# Patient Record
Sex: FEMALE | Race: WHITE | NOT HISPANIC OR LATINO | Employment: UNEMPLOYED | URBAN - NONMETROPOLITAN AREA
[De-identification: names, ages, dates, MRNs, and addresses within clinical notes are randomized per-mention and may not be internally consistent; named-entity substitution may affect disease eponyms.]

---

## 2022-11-14 ENCOUNTER — HOSPITAL ENCOUNTER (EMERGENCY)
Facility: HOSPITAL | Age: 47
Discharge: HOME | End: 2022-11-14
Attending: FAMILY MEDICINE

## 2022-11-14 VITALS
RESPIRATION RATE: 18 BRPM | HEIGHT: 69 IN | TEMPERATURE: 99 F | WEIGHT: 160 LBS | DIASTOLIC BLOOD PRESSURE: 84 MMHG | BODY MASS INDEX: 23.7 KG/M2 | OXYGEN SATURATION: 99 % | HEART RATE: 102 BPM | SYSTOLIC BLOOD PRESSURE: 138 MMHG

## 2022-11-14 DIAGNOSIS — N39.0 URINARY TRACT INFECTION WITHOUT HEMATURIA, SITE UNSPECIFIED: Primary | ICD-10-CM

## 2022-11-14 DIAGNOSIS — A59.9 TRICHOMONIASIS, UNSPECIFIED: ICD-10-CM

## 2022-11-14 LAB
ALBUMIN SERPL BCP-MCNC: 4.1 G/DL (ref 3.5–5)
ALBUMIN/GLOB SERPL: 1 {RATIO}
ALP SERPL-CCNC: 88 U/L (ref 39–100)
ALT SERPL W P-5'-P-CCNC: 20 U/L (ref 13–56)
ANION GAP SERPL CALCULATED.3IONS-SCNC: 10 MMOL/L (ref 7–16)
AST SERPL W P-5'-P-CCNC: 17 U/L (ref 15–37)
BACTERIA #/AREA URNS HPF: ABNORMAL /HPF
BASOPHILS # BLD AUTO: 0.04 K/UL (ref 0–0.2)
BASOPHILS NFR BLD AUTO: 0.5 % (ref 0–1)
BILIRUB SERPL-MCNC: 0.8 MG/DL (ref ?–1.2)
BILIRUB UR QL STRIP: NEGATIVE
BUN SERPL-MCNC: 9 MG/DL (ref 7–18)
BUN/CREAT SERPL: 11 (ref 6–20)
CALCIUM SERPL-MCNC: 9.7 MG/DL (ref 8.5–10.1)
CHLORIDE SERPL-SCNC: 99 MMOL/L (ref 98–107)
CLARITY UR: ABNORMAL
CO2 SERPL-SCNC: 33 MMOL/L (ref 21–32)
COLOR UR: YELLOW
CREAT SERPL-MCNC: 0.84 MG/DL (ref 0.55–1.02)
DIFFERENTIAL METHOD BLD: ABNORMAL
EGFR (NO RACE VARIABLE) (RUSH/TITUS): 87 ML/MIN/1.73M²
EOSINOPHIL # BLD AUTO: 0.24 K/UL (ref 0–0.5)
EOSINOPHIL NFR BLD AUTO: 3 % (ref 1–4)
ERYTHROCYTE [DISTWIDTH] IN BLOOD BY AUTOMATED COUNT: 12.7 % (ref 11.5–14.5)
GLOBULIN SER-MCNC: 4.1 G/DL (ref 2–4)
GLUCOSE SERPL-MCNC: 317 MG/DL (ref 74–106)
GLUCOSE UR STRIP-MCNC: >=1000 MG/DL
HAV IGM SER QL: NORMAL
HBV CORE IGM SER QL: NORMAL
HBV SURFACE AG SERPL QL IA: NORMAL
HCG UR QL IA.RAPID: NEGATIVE
HCT VFR BLD AUTO: 45.6 % (ref 38–47)
HCV AB SER QL: NORMAL
HGB BLD-MCNC: 15.3 G/DL (ref 12–16)
KETONES UR STRIP-SCNC: ABNORMAL MG/DL
LEUKOCYTE ESTERASE UR QL STRIP: ABNORMAL
LYMPHOCYTES # BLD AUTO: 2.1 K/UL (ref 1–4.8)
LYMPHOCYTES NFR BLD AUTO: 26.7 % (ref 27–41)
MCH RBC QN AUTO: 30.1 PG (ref 27–31)
MCHC RBC AUTO-ENTMCNC: 33.6 G/DL (ref 32–36)
MCV RBC AUTO: 89.6 FL (ref 80–96)
MONOCYTES # BLD AUTO: 0.58 K/UL (ref 0–0.8)
MONOCYTES NFR BLD AUTO: 7.4 % (ref 2–6)
MPC BLD CALC-MCNC: 10.5 FL (ref 9.4–12.4)
MUCOUS THREADS #/AREA URNS HPF: ABNORMAL /HPF
NEUTROPHILS # BLD AUTO: 4.91 K/UL (ref 1.8–7.7)
NEUTROPHILS NFR BLD AUTO: 62.4 % (ref 53–65)
NITRITE UR QL STRIP: POSITIVE
PH UR STRIP: 6 PH UNITS
PLATELET # BLD AUTO: 277 K/UL (ref 150–400)
POTASSIUM SERPL-SCNC: 3.9 MMOL/L (ref 3.5–5.1)
PROT SERPL-MCNC: 8.2 G/DL (ref 6.4–8.2)
PROT UR QL STRIP: NEGATIVE
RBC # BLD AUTO: 5.09 M/UL (ref 4.2–5.4)
RBC # UR STRIP: NEGATIVE /UL
RBC #/AREA URNS HPF: ABNORMAL /HPF
SODIUM SERPL-SCNC: 138 MMOL/L (ref 136–145)
SP GR UR STRIP: >=1.03
SQUAMOUS #/AREA URNS LPF: ABNORMAL /LPF
SYPHILIS AB INTERPRETATION: NORMAL
TRICHOMONAS #/AREA URNS HPF: ABNORMAL /HPF
UROBILINOGEN UR STRIP-ACNC: 0.2 MG/DL
WBC # BLD AUTO: 7.87 K/UL (ref 4.5–11)
WBC #/AREA URNS HPF: ABNORMAL /HPF

## 2022-11-14 PROCEDURE — 87491 CHLMYD TRACH DNA AMP PROBE: CPT | Performed by: FAMILY MEDICINE

## 2022-11-14 PROCEDURE — 99283 EMERGENCY DEPT VISIT LOW MDM: CPT | Mod: ,,, | Performed by: FAMILY MEDICINE

## 2022-11-14 PROCEDURE — 99283 PR EMERGENCY DEPT VISIT,LEVEL III: ICD-10-PCS | Mod: ,,, | Performed by: FAMILY MEDICINE

## 2022-11-14 PROCEDURE — 81001 URINALYSIS AUTO W/SCOPE: CPT | Performed by: FAMILY MEDICINE

## 2022-11-14 PROCEDURE — 96372 THER/PROPH/DIAG INJ SC/IM: CPT

## 2022-11-14 PROCEDURE — 81025 URINE PREGNANCY TEST: CPT | Performed by: FAMILY MEDICINE

## 2022-11-14 PROCEDURE — 87591 N.GONORRHOEAE DNA AMP PROB: CPT | Performed by: FAMILY MEDICINE

## 2022-11-14 PROCEDURE — 36415 COLL VENOUS BLD VENIPUNCTURE: CPT | Performed by: FAMILY MEDICINE

## 2022-11-14 PROCEDURE — 85025 COMPLETE CBC W/AUTO DIFF WBC: CPT | Performed by: FAMILY MEDICINE

## 2022-11-14 PROCEDURE — 99284 EMERGENCY DEPT VISIT MOD MDM: CPT

## 2022-11-14 PROCEDURE — 63600175 PHARM REV CODE 636 W HCPCS: Performed by: FAMILY MEDICINE

## 2022-11-14 PROCEDURE — 86780 TREPONEMA PALLIDUM: CPT | Performed by: FAMILY MEDICINE

## 2022-11-14 PROCEDURE — 87389 HIV-1 AG W/HIV-1&-2 AB AG IA: CPT | Performed by: FAMILY MEDICINE

## 2022-11-14 PROCEDURE — 80074 ACUTE HEPATITIS PANEL: CPT | Performed by: FAMILY MEDICINE

## 2022-11-14 PROCEDURE — 80053 COMPREHEN METABOLIC PANEL: CPT | Performed by: FAMILY MEDICINE

## 2022-11-14 PROCEDURE — 87086 URINE CULTURE/COLONY COUNT: CPT | Performed by: FAMILY MEDICINE

## 2022-11-14 PROCEDURE — 25000003 PHARM REV CODE 250: Performed by: FAMILY MEDICINE

## 2022-11-14 RX ORDER — ASPIRIN 81 MG/1
81 TABLET ORAL DAILY
COMMUNITY
End: 2023-06-09

## 2022-11-14 RX ORDER — LIDOCAINE HYDROCHLORIDE 10 MG/ML
1 INJECTION INFILTRATION; PERINEURAL ONCE
Status: COMPLETED | OUTPATIENT
Start: 2022-11-14 | End: 2022-11-14

## 2022-11-14 RX ORDER — DOXYCYCLINE 100 MG/1
100 CAPSULE ORAL 2 TIMES DAILY
Qty: 20 CAPSULE | Refills: 0 | Status: SHIPPED | OUTPATIENT
Start: 2022-11-14 | End: 2022-11-24

## 2022-11-14 RX ORDER — METRONIDAZOLE 500 MG/1
500 TABLET ORAL EVERY 12 HOURS
Qty: 21 TABLET | Refills: 0 | Status: SHIPPED | OUTPATIENT
Start: 2022-11-14 | End: 2022-11-21

## 2022-11-14 RX ORDER — CEFTRIAXONE 1 G/1
1 INJECTION, POWDER, FOR SOLUTION INTRAMUSCULAR; INTRAVENOUS ONCE
Status: COMPLETED | OUTPATIENT
Start: 2022-11-14 | End: 2022-11-14

## 2022-11-14 RX ADMIN — LIDOCAINE HYDROCHLORIDE 1 ML: 10 INJECTION, SOLUTION INFILTRATION; PERINEURAL at 04:11

## 2022-11-14 RX ADMIN — CEFTRIAXONE 1 G: 1 INJECTION, POWDER, FOR SOLUTION INTRAMUSCULAR; INTRAVENOUS at 04:11
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
## 2022-11-15 ENCOUNTER — TELEPHONE (OUTPATIENT)
Dept: EMERGENCY MEDICINE | Facility: HOSPITAL | Age: 47
End: 2022-11-15

## 2022-11-15 LAB
CHLAMYDIA BY PCR: NEGATIVE
HIV 1+O+2 AB SERPL QL: NORMAL
N. GONORRHOEAE (GC) BY PCR: NEGATIVE
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
## 2022-11-16 ENCOUNTER — TELEPHONE (OUTPATIENT)
Dept: EMERGENCY MEDICINE | Facility: HOSPITAL | Age: 47
End: 2022-11-16

## 2022-11-16 LAB — UA COMPLETE W REFLEX CULTURE PNL UR: NORMAL

## 2022-11-17 ENCOUNTER — TELEPHONE (OUTPATIENT)
Dept: EMERGENCY MEDICINE | Facility: HOSPITAL | Age: 47
End: 2022-11-17

## 2023-04-08 ENCOUNTER — HOSPITAL ENCOUNTER (EMERGENCY)
Facility: HOSPITAL | Age: 48
Discharge: LEFT BEFORE DISCHARGE | End: 2023-04-08
Attending: FAMILY MEDICINE

## 2023-04-08 VITALS
WEIGHT: 173 LBS | RESPIRATION RATE: 20 BRPM | OXYGEN SATURATION: 99 % | HEIGHT: 69 IN | TEMPERATURE: 99 F | HEART RATE: 111 BPM | SYSTOLIC BLOOD PRESSURE: 158 MMHG | BODY MASS INDEX: 25.62 KG/M2 | DIASTOLIC BLOOD PRESSURE: 97 MMHG

## 2023-04-08 DIAGNOSIS — N39.0 URINARY TRACT INFECTION WITHOUT HEMATURIA, SITE UNSPECIFIED: ICD-10-CM

## 2023-04-08 DIAGNOSIS — R73.9 HYPERGLYCEMIA: Primary | ICD-10-CM

## 2023-04-08 LAB
ALBUMIN SERPL BCP-MCNC: 3.3 G/DL (ref 3.5–5)
ALBUMIN/GLOB SERPL: 0.8 {RATIO}
ALP SERPL-CCNC: 103 U/L (ref 39–100)
ALT SERPL W P-5'-P-CCNC: 24 U/L (ref 13–56)
ANION GAP SERPL CALCULATED.3IONS-SCNC: 10 MMOL/L (ref 7–16)
AST SERPL W P-5'-P-CCNC: 21 U/L (ref 15–37)
BACTERIA #/AREA URNS HPF: ABNORMAL /HPF
BASOPHILS # BLD AUTO: 0.03 K/UL (ref 0–0.2)
BASOPHILS NFR BLD AUTO: 0.3 % (ref 0–1)
BILIRUB SERPL-MCNC: 0.7 MG/DL (ref ?–1.2)
BILIRUB UR QL STRIP: NEGATIVE
BUN SERPL-MCNC: 10 MG/DL (ref 7–18)
BUN/CREAT SERPL: 9 (ref 6–20)
CALCIUM SERPL-MCNC: 8.3 MG/DL (ref 8.5–10.1)
CHLORIDE SERPL-SCNC: 94 MMOL/L (ref 98–107)
CLARITY UR: ABNORMAL
CO2 SERPL-SCNC: 30 MMOL/L (ref 21–32)
COLOR UR: YELLOW
CREAT SERPL-MCNC: 1.11 MG/DL (ref 0.55–1.02)
D DIMER PPP FEU-MCNC: 0.41 ΜG/ML (ref 0–0.47)
DIFFERENTIAL METHOD BLD: ABNORMAL
EGFR (NO RACE VARIABLE) (RUSH/TITUS): 62 ML/MIN/1.73M²
EOSINOPHIL # BLD AUTO: 0.06 K/UL (ref 0–0.5)
EOSINOPHIL NFR BLD AUTO: 0.6 % (ref 1–4)
ERYTHROCYTE [DISTWIDTH] IN BLOOD BY AUTOMATED COUNT: 12 % (ref 11.5–14.5)
FLUAV AG UPPER RESP QL IA.RAPID: NEGATIVE
FLUBV AG UPPER RESP QL IA.RAPID: NEGATIVE
GLOBULIN SER-MCNC: 4.1 G/DL (ref 2–4)
GLUCOSE SERPL-MCNC: 458 MG/DL (ref 74–106)
GLUCOSE UR STRIP-MCNC: >=1000 MG/DL
HCT VFR BLD AUTO: 40.7 % (ref 38–47)
HGB BLD-MCNC: 13.5 G/DL (ref 12–16)
KETONES UR STRIP-SCNC: NEGATIVE MG/DL
LEUKOCYTE ESTERASE UR QL STRIP: ABNORMAL
LYMPHOCYTES # BLD AUTO: 1.12 K/UL (ref 1–4.8)
LYMPHOCYTES NFR BLD AUTO: 11.5 % (ref 27–41)
MCH RBC QN AUTO: 29.4 PG (ref 27–31)
MCHC RBC AUTO-ENTMCNC: 33.2 G/DL (ref 32–36)
MCV RBC AUTO: 88.7 FL (ref 80–96)
MONOCYTES # BLD AUTO: 0.98 K/UL (ref 0–0.8)
MONOCYTES NFR BLD AUTO: 10.1 % (ref 2–6)
MPC BLD CALC-MCNC: 10.6 FL (ref 9.4–12.4)
NEUTROPHILS # BLD AUTO: 7.55 K/UL (ref 1.8–7.7)
NEUTROPHILS NFR BLD AUTO: 77.5 % (ref 53–65)
NITRITE UR QL STRIP: NEGATIVE
NT-PROBNP SERPL-MCNC: 145 PG/ML (ref 1–125)
PH UR STRIP: 6 PH UNITS
PLATELET # BLD AUTO: 231 K/UL (ref 150–400)
POTASSIUM SERPL-SCNC: 4 MMOL/L (ref 3.5–5.1)
PROT SERPL-MCNC: 7.4 G/DL (ref 6.4–8.2)
PROT UR QL STRIP: 30
RBC # BLD AUTO: 4.59 M/UL (ref 4.2–5.4)
RBC # UR STRIP: ABNORMAL /UL
RBC #/AREA URNS HPF: ABNORMAL /HPF
SARS-COV+SARS-COV-2 AG RESP QL IA.RAPID: NEGATIVE
SODIUM SERPL-SCNC: 130 MMOL/L (ref 136–145)
SP GR UR STRIP: 1.01
SQUAMOUS #/AREA URNS LPF: ABNORMAL /LPF
UROBILINOGEN UR STRIP-ACNC: 0.2 MG/DL
WBC # BLD AUTO: 9.74 K/UL (ref 4.5–11)
WBC #/AREA URNS HPF: ABNORMAL /HPF
WBC CLUMPS, UA: ABNORMAL /HPF
YEAST #/AREA URNS HPF: ABNORMAL /HPF

## 2023-04-08 PROCEDURE — 99283 EMERGENCY DEPT VISIT LOW MDM: CPT

## 2023-04-08 PROCEDURE — 80053 COMPREHEN METABOLIC PANEL: CPT | Performed by: FAMILY MEDICINE

## 2023-04-08 PROCEDURE — 83880 ASSAY OF NATRIURETIC PEPTIDE: CPT | Performed by: FAMILY MEDICINE

## 2023-04-08 PROCEDURE — 99284 PR EMERGENCY DEPT VISIT,LEVEL IV: ICD-10-PCS | Mod: ,,, | Performed by: FAMILY MEDICINE

## 2023-04-08 PROCEDURE — 87428 SARSCOV & INF VIR A&B AG IA: CPT | Performed by: FAMILY MEDICINE

## 2023-04-08 PROCEDURE — 85025 COMPLETE CBC W/AUTO DIFF WBC: CPT | Performed by: FAMILY MEDICINE

## 2023-04-08 PROCEDURE — 81001 URINALYSIS AUTO W/SCOPE: CPT | Performed by: FAMILY MEDICINE

## 2023-04-08 PROCEDURE — 87077 CULTURE AEROBIC IDENTIFY: CPT | Performed by: FAMILY MEDICINE

## 2023-04-08 PROCEDURE — 99284 EMERGENCY DEPT VISIT MOD MDM: CPT | Mod: ,,, | Performed by: FAMILY MEDICINE

## 2023-04-08 PROCEDURE — 25000003 PHARM REV CODE 250: Performed by: FAMILY MEDICINE

## 2023-04-08 PROCEDURE — 85379 FIBRIN DEGRADATION QUANT: CPT | Performed by: FAMILY MEDICINE

## 2023-04-08 PROCEDURE — 87186 SC STD MICRODIL/AGAR DIL: CPT | Performed by: FAMILY MEDICINE

## 2023-04-08 RX ORDER — SODIUM CHLORIDE 9 MG/ML
1000 INJECTION, SOLUTION INTRAVENOUS
Status: DISCONTINUED | OUTPATIENT
Start: 2023-04-08 | End: 2023-04-08 | Stop reason: HOSPADM

## 2023-04-08 NOTE — ED NOTES
Pt states she cannot stay any longer and agreed to sign out ama. Stressed the importance of follow up in clinic to treat uti.

## 2023-04-08 NOTE — ED PROVIDER NOTES
Encounter Date: 2023       History     Chief Complaint   Patient presents with    Dysuria    Back Pain    Sore Throat    Leg Pain     MAYELIN. HX DVT THAT LEAD TO PE     HPI  Review of patient's allergies indicates:   Allergen Reactions    Phenergan [promethazine] Other (See Comments)     VIOLENT     Floxin [ofloxacin]     Penicillins     Reglan [metoclopramide]      Past Medical History:   Diagnosis Date    Diabetes mellitus     DVT (deep venous thrombosis)     Other pulmonary embolism without acute cor pulmonale      Past Surgical History:   Procedure Laterality Date    BLADDER SURGERY       SECTION      HYSTERECTOMY       History reviewed. No pertinent family history.  Social History     Tobacco Use    Smoking status: Never    Smokeless tobacco: Current     Types: Snuff   Substance Use Topics    Alcohol use: Yes     Comment: occasional    Drug use: Not Currently     Types: Methamphetamines     Review of Systems    Physical Exam     Initial Vitals [23 1425]   BP Pulse Resp Temp SpO2   (!) 158/97 (!) 111 20 98.9 °F (37.2 °C) 99 %      MAP       --         Physical Exam    Medical Screening Exam   See Full Note    ED Course   Procedures  Labs Reviewed   URINALYSIS, REFLEX TO URINE CULTURE - Abnormal; Notable for the following components:       Result Value    Leukocytes, UA Trace (*)     Protein, UA 30 (*)     Glucose, UA >=1000 (*)     Blood, UA Trace-Intact (*)     All other components within normal limits   URINALYSIS, MICROSCOPIC - Abnormal; Notable for the following components:    WBC, UA Too Numerous To Count (*)     Bacteria, UA Loaded (*)     Yeast, UA Occasional (*)     Squamous Epithelial Cells, UA Too Numerous To Count (*)     WBC Clumps Rare (*)     All other components within normal limits   COMPREHENSIVE METABOLIC PANEL - Abnormal; Notable for the following components:    Sodium 130 (*)     Chloride 94 (*)     Glucose 458 (*)     Creatinine 1.11 (*)     Calcium 8.3 (*)     Albumin 3.3 (*)      Globulin 4.1 (*)     Alk Phos 103 (*)     All other components within normal limits   CBC WITH DIFFERENTIAL - Abnormal; Notable for the following components:    Neutrophils % 77.5 (*)     Lymphocytes % 11.5 (*)     Monocytes % 10.1 (*)     Eosinophils % 0.6 (*)     Monocytes, Absolute 0.98 (*)     All other components within normal limits   NT-PRO NATRIURETIC PEPTIDE - Abnormal; Notable for the following components:    ProBNP 145 (*)     All other components within normal limits   D DIMER, QUANTITATIVE - Normal   SARS-COV2 (COVID) W/ FLU ANTIGEN - Normal    Narrative:     Negative SARS-CoV results should not be used as the sole basis for treatment or patient management decisions; negative results should be considered in the context of a patient's recent exposures, history and the presene of clinical signs and symptoms consistent with COVID-19.  Negative results should be treated as presumptive and confirmed by molecular assay, if necessary for patient management.   CULTURE, URINE   CBC W/ AUTO DIFFERENTIAL    Narrative:     The following orders were created for panel order CBC auto differential.  Procedure                               Abnormality         Status                     ---------                               -----------         ------                     CBC with Differential[[id number]]        Abnormal            Final result                 Please view results for these tests on the individual orders.   POCT GLUCOSE MONITORING CONTINUOUS          Imaging Results    None          Medications   0.9%  NaCl infusion (1,000 mLs Intravenous Not Given 4/8/23 1701)   insulin regular injection 10 Units 0.1 mL (10 Units Intravenous Not Given 4/8/23 1700)     Medical Decision Making:   ED Management:  CBC OK,   CMP: GLUCOSE >400    PLANNED ON IV INSULIN AND FLUIDS AND ABX FOR UTI  PT CHOOSE TO LEAVE AMA                 Clinical Impression:   Final diagnoses:  [R73.9] Hyperglycemia (Primary)  [N39.0]  Urinary tract infection without hematuria, site unspecified        ED Disposition Condition    AMA Stable                Marialuisa Kenney MD  04/08/23 [id number]

## 2023-04-08 NOTE — ED TRIAGE NOTES
PT ARRIVED TO ER WITH C/O LOWER BACK PAIN AND FOUL SMELLING URINE X 2 DAYS. ALSO HAVING MAYELIN LOWER LEG PAIN. PT STATES HAS HX OF DVT THAT CAUSED PE ABOUT 2 YEARS AGO. HAS BEEN OFF OF XARELTO FOR ABOUT A YEAR DUE TO COST. ALSO HAS SORE THROAT.

## 2023-04-12 LAB
UA COMPLETE W REFLEX CULTURE PNL UR: ABNORMAL

## 2023-06-09 ENCOUNTER — HOSPITAL ENCOUNTER (OUTPATIENT)
Facility: HOSPITAL | Age: 48
Discharge: HOME | End: 2023-06-11
Attending: FAMILY MEDICINE | Admitting: FAMILY MEDICINE

## 2023-06-09 DIAGNOSIS — R11.2 NAUSEA AND VOMITING, UNSPECIFIED VOMITING TYPE: ICD-10-CM

## 2023-06-09 DIAGNOSIS — K85.90 ACUTE PANCREATITIS WITHOUT INFECTION OR NECROSIS, UNSPECIFIED PANCREATITIS TYPE: Primary | ICD-10-CM

## 2023-06-09 PROBLEM — Z79.4 TYPE 2 DIABETES MELLITUS, WITH LONG-TERM CURRENT USE OF INSULIN: Status: ACTIVE | Noted: 2023-06-09

## 2023-06-09 PROBLEM — E11.9 TYPE 2 DIABETES MELLITUS, WITH LONG-TERM CURRENT USE OF INSULIN: Status: ACTIVE | Noted: 2023-06-09

## 2023-06-09 LAB
ALBUMIN SERPL BCP-MCNC: 3.1 G/DL (ref 3.5–5)
ALBUMIN/GLOB SERPL: 0.7 {RATIO}
ALP SERPL-CCNC: 419 U/L (ref 39–100)
ALT SERPL W P-5'-P-CCNC: 953 U/L (ref 13–56)
AMPHET UR QL SCN: POSITIVE
ANION GAP SERPL CALCULATED.3IONS-SCNC: 13 MMOL/L (ref 7–16)
AST SERPL W P-5'-P-CCNC: 545 U/L (ref 15–37)
BARBITURATES UR QL SCN: NEGATIVE
BASOPHILS # BLD AUTO: 0.03 K/UL (ref 0–0.2)
BASOPHILS NFR BLD AUTO: 0.5 % (ref 0–1)
BENZODIAZ METAB UR QL SCN: NEGATIVE
BILIRUB SERPL-MCNC: 2.2 MG/DL (ref ?–1.2)
BILIRUB UR QL STRIP: NEGATIVE
BUN SERPL-MCNC: 9 MG/DL (ref 7–18)
BUN/CREAT SERPL: 11 (ref 6–20)
CALCIUM SERPL-MCNC: 8 MG/DL (ref 8.5–10.1)
CANNABINOIDS UR QL SCN: NEGATIVE
CHLORIDE SERPL-SCNC: 97 MMOL/L (ref 98–107)
CHOLEST SERPL-MCNC: 154 MG/DL (ref 0–200)
CHOLEST/HDLC SERPL: 8.6 {RATIO}
CLARITY UR: CLEAR
CO2 SERPL-SCNC: 26 MMOL/L (ref 21–32)
COCAINE UR QL SCN: NEGATIVE
COLOR UR: YELLOW
CREAT SERPL-MCNC: 0.81 MG/DL (ref 0.55–1.02)
DIFFERENTIAL METHOD BLD: ABNORMAL
EGFR (NO RACE VARIABLE) (RUSH/TITUS): 90 ML/MIN/1.73M2
EOSINOPHIL # BLD AUTO: 0.1 K/UL (ref 0–0.5)
EOSINOPHIL NFR BLD AUTO: 1.8 % (ref 1–4)
ERYTHROCYTE [DISTWIDTH] IN BLOOD BY AUTOMATED COUNT: 14.1 % (ref 11.5–14.5)
FLUAV AG UPPER RESP QL IA.RAPID: NEGATIVE
FLUBV AG UPPER RESP QL IA.RAPID: NEGATIVE
GLOBULIN SER-MCNC: 4.5 G/DL (ref 2–4)
GLUCOSE SERPL-MCNC: 369 MG/DL (ref 70–105)
GLUCOSE SERPL-MCNC: 404 MG/DL (ref 70–105)
GLUCOSE SERPL-MCNC: 454 MG/DL (ref 74–106)
GLUCOSE UR STRIP-MCNC: >=1000 MG/DL
HCT VFR BLD AUTO: 39.2 % (ref 38–47)
HDLC SERPL-MCNC: 18 MG/DL (ref 40–60)
HGB BLD-MCNC: 13.4 G/DL (ref 12–16)
KETONES UR STRIP-SCNC: NEGATIVE MG/DL
LDLC SERPL CALC-MCNC: 78 MG/DL
LDLC/HDLC SERPL: 4.3 {RATIO}
LEUKOCYTE ESTERASE UR QL STRIP: NEGATIVE
LIPASE SERPL-CCNC: 512 U/L (ref 73–393)
LYMPHOCYTES # BLD AUTO: 1.35 K/UL (ref 1–4.8)
LYMPHOCYTES NFR BLD AUTO: 24.1 % (ref 27–41)
MCH RBC QN AUTO: 29.1 PG (ref 27–31)
MCHC RBC AUTO-ENTMCNC: 34.2 G/DL (ref 32–36)
MCV RBC AUTO: 85 FL (ref 80–96)
MONOCYTES # BLD AUTO: 0.61 K/UL (ref 0–0.8)
MONOCYTES NFR BLD AUTO: 10.9 % (ref 2–6)
MPC BLD CALC-MCNC: 11.2 FL (ref 9.4–12.4)
NEUTROPHILS # BLD AUTO: 3.52 K/UL (ref 1.8–7.7)
NEUTROPHILS NFR BLD AUTO: 62.7 % (ref 53–65)
NITRITE UR QL STRIP: NEGATIVE
NONHDLC SERPL-MCNC: 136 MG/DL
OPIATES UR QL SCN: NEGATIVE
PCP UR QL SCN: NEGATIVE
PH UR STRIP: 7 PH UNITS
PLATELET # BLD AUTO: 208 K/UL (ref 150–400)
POTASSIUM SERPL-SCNC: 3.9 MMOL/L (ref 3.5–5.1)
PROT SERPL-MCNC: 7.6 G/DL (ref 6.4–8.2)
PROT UR QL STRIP: NEGATIVE
RAPID GROUP A STREP: NEGATIVE
RBC # BLD AUTO: 4.61 M/UL (ref 4.2–5.4)
RBC # UR STRIP: NEGATIVE /UL
SARS-COV+SARS-COV-2 AG RESP QL IA.RAPID: NEGATIVE
SODIUM SERPL-SCNC: 132 MMOL/L (ref 136–145)
SP GR UR STRIP: 1.01
TRIGL SERPL-MCNC: 288 MG/DL (ref 35–150)
UROBILINOGEN UR STRIP-ACNC: 4 MG/DL
VLDLC SERPL-MCNC: 58 MG/DL
WBC # BLD AUTO: 5.61 K/UL (ref 4.5–11)

## 2023-06-09 PROCEDURE — 99222 PR INITIAL HOSPITAL CARE,LEVL II: ICD-10-PCS | Mod: ,,, | Performed by: NURSE PRACTITIONER

## 2023-06-09 PROCEDURE — G0378 HOSPITAL OBSERVATION PER HR: HCPCS

## 2023-06-09 PROCEDURE — 83036 HEMOGLOBIN GLYCOSYLATED A1C: CPT | Performed by: NURSE PRACTITIONER

## 2023-06-09 PROCEDURE — 94761 N-INVAS EAR/PLS OXIMETRY MLT: CPT

## 2023-06-09 PROCEDURE — 96361 HYDRATE IV INFUSION ADD-ON: CPT

## 2023-06-09 PROCEDURE — 80053 COMPREHEN METABOLIC PANEL: CPT | Performed by: NURSE PRACTITIONER

## 2023-06-09 PROCEDURE — 85025 COMPLETE CBC W/AUTO DIFF WBC: CPT | Performed by: NURSE PRACTITIONER

## 2023-06-09 PROCEDURE — 25000003 PHARM REV CODE 250: Performed by: NURSE PRACTITIONER

## 2023-06-09 PROCEDURE — 96374 THER/PROPH/DIAG INJ IV PUSH: CPT

## 2023-06-09 PROCEDURE — 83690 ASSAY OF LIPASE: CPT | Performed by: NURSE PRACTITIONER

## 2023-06-09 PROCEDURE — 87880 STREP A ASSAY W/OPTIC: CPT | Performed by: NURSE PRACTITIONER

## 2023-06-09 PROCEDURE — 96375 TX/PRO/DX INJ NEW DRUG ADDON: CPT

## 2023-06-09 PROCEDURE — 63600175 PHARM REV CODE 636 W HCPCS: Performed by: NURSE PRACTITIONER

## 2023-06-09 PROCEDURE — 99285 EMERGENCY DEPT VISIT HI MDM: CPT | Mod: 25

## 2023-06-09 PROCEDURE — 82962 GLUCOSE BLOOD TEST: CPT

## 2023-06-09 PROCEDURE — C9113 INJ PANTOPRAZOLE SODIUM, VIA: HCPCS | Performed by: NURSE PRACTITIONER

## 2023-06-09 PROCEDURE — 25500020 PHARM REV CODE 255: Performed by: NURSE PRACTITIONER

## 2023-06-09 PROCEDURE — 80061 LIPID PANEL: CPT | Performed by: NURSE PRACTITIONER

## 2023-06-09 PROCEDURE — 99222 1ST HOSP IP/OBS MODERATE 55: CPT | Mod: ,,, | Performed by: NURSE PRACTITIONER

## 2023-06-09 PROCEDURE — 87428 SARSCOV & INF VIR A&B AG IA: CPT | Performed by: NURSE PRACTITIONER

## 2023-06-09 PROCEDURE — 80307 DRUG TEST PRSMV CHEM ANLYZR: CPT | Performed by: NURSE PRACTITIONER

## 2023-06-09 PROCEDURE — 99285 EMERGENCY DEPT VISIT HI MDM: CPT | Mod: ,,, | Performed by: NURSE PRACTITIONER

## 2023-06-09 PROCEDURE — 99285 PR EMERGENCY DEPT VISIT,LEVEL V: ICD-10-PCS | Mod: ,,, | Performed by: NURSE PRACTITIONER

## 2023-06-09 PROCEDURE — 81003 URINALYSIS AUTO W/O SCOPE: CPT | Performed by: NURSE PRACTITIONER

## 2023-06-09 RX ORDER — SODIUM CHLORIDE 9 MG/ML
1000 INJECTION, SOLUTION INTRAVENOUS
Status: COMPLETED | OUTPATIENT
Start: 2023-06-09 | End: 2023-06-09

## 2023-06-09 RX ORDER — PANTOPRAZOLE SODIUM 40 MG/10ML
40 INJECTION, POWDER, LYOPHILIZED, FOR SOLUTION INTRAVENOUS DAILY
Status: DISCONTINUED | OUTPATIENT
Start: 2023-06-09 | End: 2023-06-11 | Stop reason: HOSPADM

## 2023-06-09 RX ORDER — HYDROMORPHONE HYDROCHLORIDE 2 MG/ML
1 INJECTION, SOLUTION INTRAMUSCULAR; INTRAVENOUS; SUBCUTANEOUS
Status: COMPLETED | OUTPATIENT
Start: 2023-06-09 | End: 2023-06-09

## 2023-06-09 RX ORDER — INSULIN ASPART 100 [IU]/ML
1-10 INJECTION, SOLUTION INTRAVENOUS; SUBCUTANEOUS EVERY 6 HOURS PRN
Status: DISCONTINUED | OUTPATIENT
Start: 2023-06-09 | End: 2023-06-10

## 2023-06-09 RX ORDER — ONDANSETRON 2 MG/ML
4 INJECTION INTRAMUSCULAR; INTRAVENOUS EVERY 6 HOURS PRN
Status: DISCONTINUED | OUTPATIENT
Start: 2023-06-09 | End: 2023-06-11 | Stop reason: HOSPADM

## 2023-06-09 RX ORDER — ONDANSETRON 2 MG/ML
4 INJECTION INTRAMUSCULAR; INTRAVENOUS
Status: COMPLETED | OUTPATIENT
Start: 2023-06-09 | End: 2023-06-09

## 2023-06-09 RX ORDER — GLUCAGON 1 MG
1 KIT INJECTION
Status: DISCONTINUED | OUTPATIENT
Start: 2023-06-09 | End: 2023-06-11 | Stop reason: HOSPADM

## 2023-06-09 RX ORDER — HYDROMORPHONE HYDROCHLORIDE 2 MG/ML
1 INJECTION, SOLUTION INTRAMUSCULAR; INTRAVENOUS; SUBCUTANEOUS EVERY 6 HOURS PRN
Status: DISCONTINUED | OUTPATIENT
Start: 2023-06-09 | End: 2023-06-11 | Stop reason: HOSPADM

## 2023-06-09 RX ORDER — SODIUM CHLORIDE 9 MG/ML
INJECTION, SOLUTION INTRAVENOUS CONTINUOUS
Status: DISCONTINUED | OUTPATIENT
Start: 2023-06-09 | End: 2023-06-11 | Stop reason: HOSPADM

## 2023-06-09 RX ADMIN — ONDANSETRON 4 MG: 2 INJECTION INTRAMUSCULAR; INTRAVENOUS at 05:06

## 2023-06-09 RX ADMIN — PANTOPRAZOLE SODIUM 40 MG: 40 INJECTION, POWDER, FOR SOLUTION INTRAVENOUS at 09:06

## 2023-06-09 RX ADMIN — SODIUM CHLORIDE 1000 ML: 9 INJECTION, SOLUTION INTRAVENOUS at 06:06

## 2023-06-09 RX ADMIN — IOPAMIDOL 100 ML: 755 INJECTION, SOLUTION INTRAVENOUS at 06:06

## 2023-06-09 RX ADMIN — HYDROMORPHONE HYDROCHLORIDE 1 MG: 2 INJECTION, SOLUTION INTRAMUSCULAR; INTRAVENOUS; SUBCUTANEOUS at 07:06

## 2023-06-09 RX ADMIN — SODIUM CHLORIDE 1000 ML: 9 INJECTION, SOLUTION INTRAVENOUS at 07:06

## 2023-06-09 RX ADMIN — SODIUM CHLORIDE 1000 ML: 9 INJECTION, SOLUTION INTRAVENOUS at 05:06

## 2023-06-09 NOTE — ED PROVIDER NOTES
"Encounter Date: 2023       History     Chief Complaint   Patient presents with    Dysuria    Fever     47 y/o WF presents to the ED with complaint of dysuria and malodorous urine for 2 months. Over the past 2 weeks has been running fever at night only. Has not checked temperature, but having chills and sweats. Took one Tylenol this morning. Also has headache, nausea and vomiting off and on for the past 2 weeks. Last vomited just PTA.   Last meal and drink:  Denies sick contacts.  Denies tobacco use or illegal drug use. States has never used IV drugs in the past. Reports had one [employer] beer today to " help with kidneys".    The history is provided by the patient.   Review of patient's allergies indicates:   Allergen Reactions    Phenergan [promethazine] Other (See Comments)     VIOLENT     Floxin [ofloxacin]     Penicillins     Reglan [metoclopramide]      Past Medical History:   Diagnosis Date    Diabetes mellitus     DVT (deep venous thrombosis)     Other pulmonary embolism without acute cor pulmonale      Past Surgical History:   Procedure Laterality Date    BLADDER SURGERY       SECTION      HYSTERECTOMY       History reviewed. No pertinent family history.  Social History     Tobacco Use    Smoking status: Never    Smokeless tobacco: Current     Types: Snuff   Substance Use Topics    Alcohol use: Yes     Comment: occasional    Drug use: Not Currently     Types: Methamphetamines     Review of Systems   Constitutional:  Positive for activity change, appetite change, chills, diaphoresis, fatigue and fever.   HENT: Negative.  Negative for congestion, ear pain, sinus pressure, sinus pain, sore throat and trouble swallowing.    Eyes: Negative.  Negative for pain and visual disturbance.   Respiratory: Negative.  Negative for cough and shortness of breath.    Cardiovascular: Negative.  Negative for chest pain, palpitations and leg swelling.   Gastrointestinal:  Positive for nausea and vomiting. " Negative for abdominal pain, constipation and diarrhea.   Genitourinary:  Positive for dysuria. Negative for hematuria.        Odor to urine   Musculoskeletal:  Positive for myalgias (generalized body aches). Negative for back pain.   Skin: Negative.  Negative for rash.   Neurological:  Positive for headaches. Negative for dizziness and weakness.   Hematological: Negative.    Psychiatric/Behavioral: Negative.       Physical Exam     Initial Vitals [06/09/23 1640]   BP Pulse Resp Temp SpO2   122/88 105 18 98 °F (36.7 °C) 97 %      MAP       --         Physical Exam    Nursing note and vitals reviewed.  Constitutional: Vital signs are normal. She appears well-developed and well-nourished. She is cooperative. She appears ill. No distress.   HENT:   Head: Normocephalic and atraumatic.   Right Ear: Tympanic membrane, external ear and ear canal normal.   Left Ear: Tympanic membrane, external ear and ear canal normal.   Nose: Nose normal. Right sinus exhibits no maxillary sinus tenderness and no frontal sinus tenderness. Left sinus exhibits no maxillary sinus tenderness and no frontal sinus tenderness.   Mouth/Throat: Uvula is midline and oropharynx is clear and moist. Mucous membranes are dry.   Eyes: Conjunctivae and lids are normal. Pupils are equal, round, and reactive to light.   Neck: Neck supple.   Normal range of motion.   Full passive range of motion without pain.     Cardiovascular:  Regular rhythm, normal heart sounds, intact distal pulses and normal pulses.   Tachycardia present.         Pulmonary/Chest: Effort normal and breath sounds normal. She has no decreased breath sounds. She has no wheezes. She has no rhonchi. She has no rales.   Abdominal: Abdomen is soft. Bowel sounds are normal. There is abdominal tenderness in the left upper quadrant.   No right CVA tenderness.  No left CVA tenderness. There is no rebound and no guarding.   Musculoskeletal:         General: Normal range of motion.      Cervical  back: Full passive range of motion without pain, normal range of motion and neck supple.     Lymphadenopathy:     She has no cervical adenopathy.   Neurological: She is alert and oriented to person, place, and time. She has normal strength.   Skin: Skin is warm and dry. Capillary refill takes less than 2 seconds. No rash noted.   Psychiatric: She has a normal mood and affect. Her speech is normal and behavior is normal.       Medical Screening Exam   See Full Note    ED Course   Procedures  Labs Reviewed   URINALYSIS, REFLEX TO URINE CULTURE - Abnormal; Notable for the following components:       Result Value    Glucose, UA >=1000 (*)     Urobilinogen, UA 4.0 (*)     All other components within normal limits   COMPREHENSIVE METABOLIC PANEL - Abnormal; Notable for the following components:    Sodium 132 (*)     Chloride 97 (*)     Glucose 454 (*)     Calcium 8.0 (*)     Albumin 3.1 (*)     Globulin 4.5 (*)     Bilirubin, Total 2.2 (*)     Alk Phos 419 (*)      (*)      (*)     All other components within normal limits   DRUG SCREEN, URINE (BEAKER) - Abnormal; Notable for the following components:    Amphetamine, Urine Positive (*)     All other components within normal limits   LIPASE - Abnormal; Notable for the following components:    Lipase 512 (*)     All other components within normal limits   CBC WITH DIFFERENTIAL - Abnormal; Notable for the following components:    Lymphocytes % 24.1 (*)     Monocytes % 10.9 (*)     All other components within normal limits   LIPID PANEL - Abnormal; Notable for the following components:    Triglycerides 288 (*)     HDL Cholesterol 18 (*)     All other components within normal limits   POCT GLUCOSE MONITORING CONTINUOUS - Abnormal; Notable for the following components:    POC Glucose 404 (*)     All other components within normal limits   POCT GLUCOSE MONITORING CONTINUOUS - Abnormal; Notable for the following components:    POC Glucose 369 (*)     All other  components within normal limits   THROAT SCREEN, RAPID STREP - Normal   SARS-COV2 (COVID) W/ FLU ANTIGEN - Normal    Narrative:     Negative SARS-CoV results should not be used as the sole basis for treatment or patient management decisions; negative results should be considered in the context of a patient's recent exposures, history and the presene of clinical signs and symptoms consistent with COVID-19.  Negative results should be treated as presumptive and confirmed by molecular assay, if necessary for patient management.   CBC W/ AUTO DIFFERENTIAL    Narrative:     The following orders were created for panel order CBC auto differential.  Procedure                               Abnormality         Status                     ---------                               -----------         ------                     CBC with Differential[[id number]]        Abnormal            Final result                 Please view results for these tests on the individual orders.   HEMOGLOBIN A1C   POCT GLUCOSE MONITORING CONTINUOUS          Imaging Results              CT Abdomen Pelvis With Contrast (Final result)  Result time 06/09/23 18:23:25      Final result by Sridhar Varela DO (06/09/23 18:23:25)                   Impression:      Interstitial pancreatitis.  No pseudocyst.      Electronically signed by: Sridhar Varela  Date:    06/09/2023  Time:    18:23               Narrative:    EXAMINATION:  CT ABDOMEN PELVIS WITH CONTRAST    CLINICAL HISTORY:  Nausea/vomiting;Elevarted lipase, elevated liver enzymes;    COMPARISON:  None.    TECHNIQUE:  CT ABDOMEN PELVIS WITH [id number] cc of Isovue 370    FINDINGS:  Lower lobes: Clear.    Cardiac: No effusion.    Abdomen:    Hepatobiliary/gallbladder: Normal    Spleen: Normal    Pancreas: Mild fat stranding surrounding the pancreas.    Adrenal/Genitourinary system: Normal    Bowel and Mesentery: There is no evidence for bowel obstruction.    Peritoneum: Normal.    Retroperitoneum:  No enlarged lymph nodes.    Vasculature: Normal.    Reproductive: Uterus removed.  Right ovary is normal.    Lymph nodes: No enlarged lymph nodes.    Abdominal wall: Normal.    Osseous structures: Normal.                                       Medications   sodium chloride 0.9% bolus 1,000 mL 1,000 mL (0 mLs Intravenous Stopped 6/9/23 1837)   ondansetron injection 4 mg (4 mg Intravenous Given 6/9/23 1716)   iopamidoL (ISOVUE-370) injection 100 mL (100 mLs Intravenous Given 6/9/23 1806)   0.9%  NaCl infusion (0 mLs Intravenous Stopped 6/9/23 1956)   HYDROmorphone (PF) injection 1 mg (1 mg Intravenous Given 6/9/23 1947)   0.9%  NaCl infusion (1,000 mLs Intravenous New Bag 6/9/23 1947)     Medical Decision Making:   Clinical Tests:   Lab Tests: Reviewed and Ordered  The following lab test(s) were unremarkable: CMP, Urinalysis, Lipase and CBC  Radiological Study: Ordered and Reviewed  ED Management:  [facility]  Patient presents for emergent evaluation of dysuria, fever, nausea and vomiting that poses a threat to life and/or bodily function.    In the ED patient found to have VS wnl. Lungs clear. HRRR,. BS+ Left upper quad tenderness. No rebound or guarding.  I ordered labs and personally reviewed them.  Labs: CBC wnl. Na 132, K+ 3.9, BUN/CR 9/0.81, glucose 454 mg/dL, Corrected Ca level of 8.7,  Total Bili 2.2. UDS:+ amphetamine. UA: >1000 glucose, no infection noted. Lipid and A1C pending.  I ordered CT scan and personally reviewed it and reviewed the radiologist interpretation.  CT abdomen/pelvis with contrast revealed pancreatitis.    Admission [facility]  I discussed the patient presentation labs and CT findings with the consultant of Dr. Santos for admission to [facility] for IV fluids, pain and nausea management to treat pancreatitis.  Patient was managed in the ED with IV NS 1 liter, Zofran 4 mg IV x 1  The response to treatment was nausea improved.  No vomiting while in the ED.  Dr. Santos agreed to admission and  treatment plan at [facility].     19:45 Pain up to 7/10 now. Dilaudid 1 mg IV x 1.  Other:   I have discussed this case with another health care provider.       <> Summary of the Discussion: Dr. Marin agreed to CT abdomen/pelvis with contrast.                       Clinical Impression:   Final diagnoses:  [K85.90] Acute pancreatitis without infection or necrosis, unspecified pancreatitis type (Primary)  [R11.2] Nausea and vomiting, unspecified vomiting type        ED Disposition Condition    Observation Stable                         Leyla Whipple, MICHEL  06/09/23 1957

## 2023-06-09 NOTE — Clinical Note
Diagnosis: Acute pancreatitis without infection or necrosis, unspecified pancreatitis type [[id number]]   Future Attending Provider: ARNOLDO MARQUEZ [[id number]]   Requested Bed Type: Standard [1]   Admitting Provider:: SHERRY SAWYER [[id number]]   Special Needs:: No Special Needs [1]

## 2023-06-09 NOTE — ED TRIAGE NOTES
Presents with c/o dysuria and malodorous urine for the past 2 months.  Reports fever and headache with fever. Left from here AMA on 4/8 for same thing and has not been treated.

## 2023-06-10 LAB
ALBUMIN SERPL BCP-MCNC: 2.6 G/DL (ref 3.5–5)
ALBUMIN/GLOB SERPL: 0.7 {RATIO}
ALP SERPL-CCNC: 353 U/L (ref 39–100)
ALT SERPL W P-5'-P-CCNC: 808 U/L (ref 13–56)
ANION GAP SERPL CALCULATED.3IONS-SCNC: 7 MMOL/L (ref 7–16)
AST SERPL W P-5'-P-CCNC: 466 U/L (ref 15–37)
BASOPHILS # BLD AUTO: 0.04 K/UL (ref 0–0.2)
BASOPHILS NFR BLD AUTO: 0.7 % (ref 0–1)
BILIRUB SERPL-MCNC: 1.7 MG/DL (ref ?–1.2)
BUN SERPL-MCNC: 5 MG/DL (ref 7–18)
BUN/CREAT SERPL: 7 (ref 6–20)
CALCIUM SERPL-MCNC: 7.6 MG/DL (ref 8.5–10.1)
CHLORIDE SERPL-SCNC: 104 MMOL/L (ref 98–107)
CO2 SERPL-SCNC: 30 MMOL/L (ref 21–32)
CREAT SERPL-MCNC: 0.73 MG/DL (ref 0.55–1.02)
DIFFERENTIAL METHOD BLD: ABNORMAL
EGFR (NO RACE VARIABLE) (RUSH/TITUS): 102 ML/MIN/1.73M2
EOSINOPHIL # BLD AUTO: 0.23 K/UL (ref 0–0.5)
EOSINOPHIL NFR BLD AUTO: 4.3 % (ref 1–4)
ERYTHROCYTE [DISTWIDTH] IN BLOOD BY AUTOMATED COUNT: 14.5 % (ref 11.5–14.5)
EST. AVERAGE GLUCOSE BLD GHB EST-MCNC: 280 MG/DL
GLOBULIN SER-MCNC: 3.9 G/DL (ref 2–4)
GLUCOSE SERPL-MCNC: 195 MG/DL (ref 70–105)
GLUCOSE SERPL-MCNC: 214 MG/DL (ref 70–105)
GLUCOSE SERPL-MCNC: 245 MG/DL (ref 70–105)
GLUCOSE SERPL-MCNC: 259 MG/DL (ref 70–105)
GLUCOSE SERPL-MCNC: 265 MG/DL (ref 74–106)
HBA1C MFR BLD HPLC: 11 % (ref 4.5–6.6)
HCT VFR BLD AUTO: 36.9 % (ref 38–47)
HGB BLD-MCNC: 12.5 G/DL (ref 12–16)
LYMPHOCYTES # BLD AUTO: 1.49 K/UL (ref 1–4.8)
LYMPHOCYTES NFR BLD AUTO: 27.5 % (ref 27–41)
MCH RBC QN AUTO: 29.3 PG (ref 27–31)
MCHC RBC AUTO-ENTMCNC: 33.9 G/DL (ref 32–36)
MCV RBC AUTO: 86.4 FL (ref 80–96)
MONOCYTES # BLD AUTO: 0.71 K/UL (ref 0–0.8)
MONOCYTES NFR BLD AUTO: 13.1 % (ref 2–6)
MPC BLD CALC-MCNC: 11.3 FL (ref 9.4–12.4)
NEUTROPHILS # BLD AUTO: 2.94 K/UL (ref 1.8–7.7)
NEUTROPHILS NFR BLD AUTO: 54.4 % (ref 53–65)
PLATELET # BLD AUTO: 198 K/UL (ref 150–400)
POTASSIUM SERPL-SCNC: 3.9 MMOL/L (ref 3.5–5.1)
PROT SERPL-MCNC: 6.5 G/DL (ref 6.4–8.2)
RBC # BLD AUTO: 4.27 M/UL (ref 4.2–5.4)
SODIUM SERPL-SCNC: 137 MMOL/L (ref 136–145)
WBC # BLD AUTO: 5.41 K/UL (ref 4.5–11)

## 2023-06-10 PROCEDURE — 96361 HYDRATE IV INFUSION ADD-ON: CPT

## 2023-06-10 PROCEDURE — 63600175 PHARM REV CODE 636 W HCPCS: Performed by: NURSE PRACTITIONER

## 2023-06-10 PROCEDURE — 96372 THER/PROPH/DIAG INJ SC/IM: CPT | Performed by: REGISTERED NURSE

## 2023-06-10 PROCEDURE — 96376 TX/PRO/DX INJ SAME DRUG ADON: CPT

## 2023-06-10 PROCEDURE — G0378 HOSPITAL OBSERVATION PER HR: HCPCS

## 2023-06-10 PROCEDURE — C9113 INJ PANTOPRAZOLE SODIUM, VIA: HCPCS | Performed by: NURSE PRACTITIONER

## 2023-06-10 PROCEDURE — 85025 COMPLETE CBC W/AUTO DIFF WBC: CPT | Performed by: NURSE PRACTITIONER

## 2023-06-10 PROCEDURE — 99900035 HC TECH TIME PER 15 MIN (STAT)

## 2023-06-10 PROCEDURE — 96372 THER/PROPH/DIAG INJ SC/IM: CPT | Performed by: NURSE PRACTITIONER

## 2023-06-10 PROCEDURE — 99232 PR SUBSEQUENT HOSPITAL CARE,LEVL II: ICD-10-PCS | Mod: ,,, | Performed by: REGISTERED NURSE

## 2023-06-10 PROCEDURE — 25000003 PHARM REV CODE 250: Performed by: NURSE PRACTITIONER

## 2023-06-10 PROCEDURE — 63600175 PHARM REV CODE 636 W HCPCS: Performed by: REGISTERED NURSE

## 2023-06-10 PROCEDURE — 80053 COMPREHEN METABOLIC PANEL: CPT | Performed by: NURSE PRACTITIONER

## 2023-06-10 PROCEDURE — 94761 N-INVAS EAR/PLS OXIMETRY MLT: CPT

## 2023-06-10 PROCEDURE — 99232 SBSQ HOSP IP/OBS MODERATE 35: CPT | Mod: ,,, | Performed by: REGISTERED NURSE

## 2023-06-10 PROCEDURE — 82962 GLUCOSE BLOOD TEST: CPT

## 2023-06-10 RX ORDER — INSULIN ASPART 100 [IU]/ML
1-10 INJECTION, SOLUTION INTRAVENOUS; SUBCUTANEOUS
Status: DISCONTINUED | OUTPATIENT
Start: 2023-06-10 | End: 2023-06-11 | Stop reason: HOSPADM

## 2023-06-10 RX ADMIN — SODIUM CHLORIDE: 9 INJECTION, SOLUTION INTRAVENOUS at 05:06

## 2023-06-10 RX ADMIN — PANTOPRAZOLE SODIUM 40 MG: 40 INJECTION, POWDER, FOR SOLUTION INTRAVENOUS at 08:06

## 2023-06-10 RX ADMIN — HYDROMORPHONE HYDROCHLORIDE 1 MG: 2 INJECTION, SOLUTION INTRAMUSCULAR; INTRAVENOUS; SUBCUTANEOUS at 05:06

## 2023-06-10 RX ADMIN — INSULIN ASPART 2 UNITS: 100 INJECTION, SOLUTION INTRAVENOUS; SUBCUTANEOUS at 05:06

## 2023-06-10 RX ADMIN — INSULIN ASPART 2 UNITS: 100 INJECTION, SOLUTION INTRAVENOUS; SUBCUTANEOUS at 12:06

## 2023-06-10 RX ADMIN — INSULIN ASPART 4 UNITS: 100 INJECTION, SOLUTION INTRAVENOUS; SUBCUTANEOUS at 11:06

## 2023-06-10 RX ADMIN — INSULIN ASPART 3 UNITS: 100 INJECTION, SOLUTION INTRAVENOUS; SUBCUTANEOUS at 09:06

## 2023-06-10 RX ADMIN — INSULIN ASPART 6 UNITS: 100 INJECTION, SOLUTION INTRAVENOUS; SUBCUTANEOUS at 06:06

## 2023-06-10 NOTE — PLAN OF CARE
Care plan reviewed  Problem: Adult Inpatient Plan of Care  Goal: Plan of Care Review  Outcome: Ongoing, Progressing  Goal: Patient-Specific Goal (Individualized)  Outcome: Ongoing, Progressing  Goal: Absence of Hospital-Acquired Illness or Injury  Outcome: Ongoing, Progressing  Goal: Optimal Comfort and Wellbeing  Outcome: Ongoing, Progressing  Goal: Readiness for Transition of Care  Outcome: Ongoing, Progressing     Problem: Diabetes Comorbidity  Goal: Blood Glucose Level Within Targeted Range  Outcome: Ongoing, Progressing     Problem: Pain Acute  Goal: Acceptable Pain Control and Functional Ability  Outcome: Ongoing, Progressing     Problem: Fluid Imbalance (Pancreatitis)  Goal: Fluid Balance  Outcome: Ongoing, Progressing     Problem: Infection (Pancreatitis)  Goal: Infection Symptom Resolution  Outcome: Ongoing, Progressing     Problem: Nutrition Impaired (Pancreatitis)  Goal: Optimal Nutrition Intake  Outcome: Ongoing, Progressing     Problem: Pain (Pancreatitis)  Goal: Acceptable Pain Control  Outcome: Ongoing, Progressing     Problem: Respiratory Compromise (Pancreatitis)  Goal: Effective Oxygenation and Ventilation  Outcome: Ongoing, Progressing

## 2023-06-10 NOTE — SUBJECTIVE & OBJECTIVE
Interval History: progress    Review of Systems   Constitutional: Negative.    HENT: Negative.     Eyes: Negative.    Respiratory: Negative.     Cardiovascular: Negative.    Gastrointestinal: Negative.         Denies any tenderness   Endocrine: Negative.    Genitourinary: Negative.    Musculoskeletal: Negative.    Skin: Negative.    Allergic/Immunologic: Negative.    Neurological: Negative.    Hematological: Negative.    Psychiatric/Behavioral: Negative.     Objective:     Vital Signs (Most Recent):  Temp: 98.2 °F (36.8 °C) (06/10/23 1200)  Pulse: 79 (06/10/23 1200)  Resp: 18 (06/10/23 1200)  BP: 114/71 (06/10/23 1200)  SpO2: 98 % (06/10/23 1256) Vital Signs (24h Range):  Temp:  [97.7 °F (36.5 °C)-98.6 °F (37 °C)] 98.2 °F (36.8 °C)  Pulse:  [] 79  Resp:  [17-20] 18  SpO2:  [94 %-100 %] 98 %  BP: (100-125)/(61-88) 114/71     Weight: 70.8 kg (156 lb)  Body mass index is 23.04 kg/m².    Intake/Output Summary (Last 24 hours) at 6/10/2023 1329  Last data filed at 6/9/2023 1956  Gross per 24 hour   Intake 2000 ml   Output --   Net 2000 ml         Physical Exam  Vitals and nursing note reviewed.   Constitutional:       General: She is not in acute distress.     Appearance: Normal appearance. She is not ill-appearing, toxic-appearing or diaphoretic.   HENT:      Head: Normocephalic.      Nose: Nose normal.      Mouth/Throat:      Mouth: Mucous membranes are moist.      Pharynx: Oropharynx is clear.   Eyes:      Conjunctiva/sclera: Conjunctivae normal.   Cardiovascular:      Rate and Rhythm: Normal rate and regular rhythm.   Pulmonary:      Effort: Pulmonary effort is normal.   Abdominal:      General: Bowel sounds are normal. There is no distension.      Palpations: Abdomen is soft. There is no mass.      Tenderness: There is no abdominal tenderness. There is no right CVA tenderness, left CVA tenderness, guarding or rebound.      Hernia: No hernia is present.   Musculoskeletal:         General: Normal range of motion.       Cervical back: Normal range of motion.   Skin:     General: Skin is warm and dry.      Capillary Refill: Capillary refill takes less than 2 seconds.   Neurological:      General: No focal deficit present.      Mental Status: She is alert and oriented to person, place, and time. Mental status is at baseline.      Cranial Nerves: No cranial nerve deficit.      Sensory: No sensory deficit.      Motor: No weakness.      Coordination: Coordination normal.      Gait: Gait normal.      Deep Tendon Reflexes: Reflexes normal.   Psychiatric:         Mood and Affect: Mood normal.         Behavior: Behavior normal.         Thought Content: Thought content normal.         Judgment: Judgment normal.           Significant Labs: All pertinent labs within the past 24 hours have been reviewed.  BMP:   Recent Labs   Lab 06/10/23  0502   *      K 3.9      CO2 30   BUN 5*   CREATININE 0.73   CALCIUM 7.6*     CBC:   Recent Labs   Lab 06/09/23  1714 06/10/23  0503   WBC 5.61 5.41   HGB 13.4 12.5   HCT 39.2 36.9*    198       Significant Imaging: I have reviewed all pertinent imaging results/findings within the past 24 hours.

## 2023-06-10 NOTE — ASSESSMENT & PLAN NOTE
Patient's FSGs are uncontrolled due to hyperglycemia on current medication regimen.  Last A1c reviewed- No results found for: LABA1C, HGBA1C  Most recent fingerstick glucose reviewed- No results for input(s): POCTGLUCOSE in the last 24 hours.  Current correctional scale  Medium  Anti-hyperglycemic dose as follows-   Antihyperglycemics (From admission, onward)    Start     Stop Route Frequency Ordered    06/09/23 2215  insulin aspart U-100 injection 1-10 Units         -- SubQ Every 6 hours PRN 06/09/23 2115        Hold Oral hypoglycemics while patient is in the hospital.

## 2023-06-10 NOTE — HOSPITAL COURSE
"06/10/2023 Patient states that her pain is "much better." No tenderness on exam. Labs have improved with WBC down to 3.53, Alk Phos 353, Bili 1.7, , . Patient wishes to start back on a diet. Will advance diet to full liquids and see how she tolerates.       2023: Pt states she feels. Has tolerated oral intake since yesterday. Reports has not needed pain medicine and had no nausea or vomiting. Pt has  to attend today. Will discharge home with zofran and follow up with PCP.   "

## 2023-06-10 NOTE — H&P
"[facility]  Hospital Medicine  History & Physical    Patient Name: Clarice Dupree  MRN: [id number]  Patient Class: OP- Observation  Admission Date: 6/9/2023  Attending Physician: Marialuisa Kenney MD   Primary Care Provider: Primary Doctor No         Patient information was obtained from patient and ER records.     Subjective:     Principal Problem:Acute pancreatitis without infection or necrosis    Chief Complaint:   Chief Complaint   Patient presents with    Dysuria    Fever        HPI: 49 y/o WF with PMHx Diabetes is admitted to [facility] through the ED for IV fluids, pain and antiemetics for pancreatitis. ED labs were CBC 5.61, H&H 13.4/39.2, Na 132, K+ 3.9, gap 13, BUN/CR 9/0.81, glucose 454 mg.dL, Lipase 512, , , Total Bili 2.2. Was found to have LUQ pain. CT scan of abdomen/pelvis with contrast revealed interstitial pancreatitis. While in the ED she received one liter of IV NS, then started at 100 ml/hr. Was given Zofran 4 mg IV for nausea with vomiting and Dilaudid 1 mg IV for abdominal pain. Symptoms improved. Patient denied any previous episodes of pancreatitis. She does not have a PCP and has been non-complaint with Tresiba injection for her diabetes. Denies tobacco use or illegal drug use. States has never used IV drugs in the past. Reports had one [employer] today to " help with kidneys".Discussed patient's case, labs, CT scan and medication reconciliation done with Dr. Santos.        No new subjective & objective note has been filed under this hospital service since the last note was generated.    Assessment/Plan:     * Acute pancreatitis without infection or necrosis  06/09/2023: Will keep patient NPO for now. Once nausea and vomiting has resolved and abdominal pain improved will start clear liquids and advance as tolerated.  -NPO  - ml/hr   -Dilaudid 1 mg IV every 6 hours as needed for pain  -Zofran 4 mg IV every 6 " hours as needed for nausea/vomting  -Protonix 40 mg IV daily  -CBC/CMP in am      Nausea and vomiting  06/09/2023: Will continue NS at 100 ml/hr and Zofran 4 mg IV every 6 hours as needed for nausea/vomiting.      Type 2 diabetes mellitus, with long-term current use of insulin  Patient's FSGs are uncontrolled due to hyperglycemia on current medication regimen.  Last A1c reviewed- No results found for: LABA1C, HGBA1C  Most recent fingerstick glucose reviewed- No results for input(s): POCTGLUCOSE in the last 24 hours.  Current correctional scale  Medium  Anti-hyperglycemic dose as follows-   Antihyperglycemics (From admission, onward)      Start     Stop Route Frequency Ordered    06/09/23 2215  insulin aspart U-100 injection 1-10 Units         -- SubQ Every 6 hours PRN 06/09/23 2115          Hold Oral hypoglycemics while patient is in the hospital.      VTE Risk Mitigation (From admission, onward)           Ordered     IP VTE LOW RISK PATIENT  Once         06/09/23 2115     Place LAKHWINDER hose  Until discontinued         06/09/23 2115     Place sequential compression device  Until discontinued         06/09/23 2115                         On 06/10/2023, patient should be placed in hospital observation services under my care in collaboration with Dr. Santos. Discussed patient's case, labs and medication reconciliation done.      MICHEL Louie  Department of Hospital Medicine  [facility]

## 2023-06-10 NOTE — ASSESSMENT & PLAN NOTE
06/09/2023: Will continue NS at 100 ml/hr and Zofran 4 mg IV every 6 hours as needed for nausea/vomiting.

## 2023-06-10 NOTE — NURSING
Arrived via WC from ER. Assisted to bed, instructed CB use, voices understanding. SR x 2, CB near.

## 2023-06-10 NOTE — SUBJECTIVE & OBJECTIVE
Past Medical History:   Diagnosis Date    Diabetes mellitus     DVT (deep venous thrombosis)     Other pulmonary embolism without acute cor pulmonale        Past Surgical History:   Procedure Laterality Date    BLADDER SURGERY       SECTION      HYSTERECTOMY         Review of patient's allergies indicates:   Allergen Reactions    Phenergan [promethazine] Other (See Comments)     VIOLENT     Floxin [ofloxacin]     Penicillins     Reglan [metoclopramide]        No current facility-administered medications on file prior to encounter.     Current Outpatient Medications on File Prior to Encounter   Medication Sig    insulin degludec (TRESIBA U-100 INSULIN SUBQ) Inject into the skin.    [DISCONTINUED] aspirin (ECOTRIN) 81 MG EC tablet Take 81 mg by mouth once daily.     Family History       Problem Relation (Age of Onset)    Cancer Mother, Father    Diabetes Mother, Father    Hypertension Mother, Father          Tobacco Use    Smoking status: Never    Smokeless tobacco: Current     Types: Snuff   Substance and Sexual Activity    Alcohol use: Yes     Alcohol/week: 1.0 standard drink     Types: 1 Cans of beer per week     Comment: occasional    Drug use: Not Currently     Types: Methamphetamines    Sexual activity: Yes     Review of Systems   Constitutional:  Positive for appetite change, fatigue and fever. Negative for activity change.   HENT: Negative.  Negative for congestion, ear pain, sore throat and trouble swallowing.    Eyes: Negative.  Negative for pain and visual disturbance.   Respiratory: Negative.  Negative for cough and shortness of breath.    Cardiovascular: Negative.    Gastrointestinal:  Positive for abdominal pain, nausea and vomiting. Negative for blood in stool and diarrhea.   Genitourinary:  Positive for dysuria. Negative for flank pain and frequency.   Musculoskeletal: Negative.    Skin: Negative.  Negative for rash.   Neurological:  Positive for weakness. Negative for dizziness,  light-headedness and headaches.   Hematological: Negative.    Psychiatric/Behavioral: Negative.     Objective:     Vital Signs (Most Recent):  Temp: 98.6 °F (37 °C) (06/09/23 2000)  Pulse: 89 (06/09/23 2000)  Resp: 18 (06/09/23 2000)  BP: 115/64 (06/09/23 2000)  SpO2: 96 % (06/09/23 2000) Vital Signs (24h Range):  Temp:  [98 °F (36.7 °C)-98.6 °F (37 °C)] 98.6 °F (37 °C)  Pulse:  [] 89  Resp:  [18-20] 18  SpO2:  [96 %-100 %] 96 %  BP: (114-125)/(64-88) 115/64     Weight: 70.8 kg (156 lb)  Body mass index is 23.04 kg/m².     Physical Exam  Vitals and nursing note reviewed.   Constitutional:       General: She is awake. She is not in acute distress.     Appearance: Normal appearance. She is well-developed. She is ill-appearing. She is not toxic-appearing or diaphoretic.   HENT:      Head: Normocephalic and atraumatic.      Right Ear: Tympanic membrane, ear canal and external ear normal.      Left Ear: Tympanic membrane, ear canal and external ear normal.      Nose: Nose normal.      Mouth/Throat:      Mouth: Mucous membranes are dry.      Pharynx: Oropharynx is clear. Uvula midline.   Eyes:      General: Lids are normal.      Extraocular Movements: Extraocular movements intact.      Conjunctiva/sclera: Conjunctivae normal.      Pupils: Pupils are equal, round, and reactive to light.   Cardiovascular:      Rate and Rhythm: Normal rate and regular rhythm.      Pulses: Normal pulses.           Radial pulses are 2+ on the right side and 2+ on the left side.        Posterior tibial pulses are 2+ on the right side and 2+ on the left side.      Heart sounds: Normal heart sounds.   Pulmonary:      Effort: Pulmonary effort is normal.      Breath sounds: Normal breath sounds and air entry.   Abdominal:      General: Bowel sounds are normal.      Palpations: Abdomen is soft.      Tenderness: There is abdominal tenderness in the left upper quadrant. There is no guarding or rebound.   Musculoskeletal:         General: Normal  range of motion.      Cervical back: Normal range of motion and neck supple.      Right lower leg: No edema.      Left lower leg: No edema.   Lymphadenopathy:      Cervical: No cervical adenopathy.   Skin:     General: Skin is warm and dry.      Capillary Refill: Capillary refill takes less than 2 seconds.   Neurological:      General: No focal deficit present.      Mental Status: She is alert and oriented to person, place, and time.      Sensory: Sensation is intact.      Motor: Motor function is intact.   Psychiatric:         Attention and Perception: Attention normal.         Mood and Affect: Mood and affect normal.         Speech: Speech normal.         Behavior: Behavior normal. Behavior is cooperative.         Thought Content: Thought content normal.         Cognition and Memory: Cognition normal.            CRANIAL NERVES     CN III, IV, VI   Pupils are equal, round, and reactive to light.     Significant Labs: All pertinent labs within the past 24 hours have been reviewed.  CBC:   Recent Labs   Lab 06/09/23  1714   WBC 5.61   HGB 13.4   HCT 39.2        CMP:   Recent Labs   Lab 06/09/23  1714   *   K 3.9   CL 97*   CO2 26   *   BUN 9   CREATININE 0.81   CALCIUM 8.0*   PROT 7.6   ALBUMIN 3.1*   BILITOT 2.2*   ALKPHOS 419*   *   *   ANIONGAP 13     Recent Lab Results  (Last 5 results in the past 24 hours)        06/09/23  1834   06/09/23  1723   06/09/23  1714   06/09/23  1702   06/09/23  1656        Influenza B, Molecular   Negative             Benzodiazepines         Negative       Cocaine         Negative  Comment:   The results of screening tests should be considered presumptive. Confirmatory testing is available upon request.    Cutoff Points:  PCP:         25ng/mL  AMPH:        500ng/mL  TARIQ:        200ng/mL  GURWINDER:        200ng/mL  THC:         50ng/mL  AMANDA:         300ng/mL  OPI:         2000ng/mL       BARBITURATES         Negative       Albumin/Globulin Ratio      0.7           Albumin     3.1           Alkaline Phosphatase     419           ALT     953           Amphetamine, Urine         Positive       Anion Gap     13           Appearance, UA         Clear       AST     545           Baso #     0.03           Basophil %     0.5           Bilirubin (UA)         Negative       BILIRUBIN TOTAL     2.2           BUN     9           BUN/CREAT RATIO     11           Calcium     8.0           Cannabinoid Scrn, Ur         Negative       Chloride     97           CHOL/HDLC Ratio     8.6           Cholesterol     154  Comment:   <200 mg/dL:  Desirable  200-240 mg/dL: Borderline High  >240 mg/dL:  High           CO2     26           Color, UA         Yellow       COVID-19 Ag   Negative             Creatinine     0.81           Differential Type     Auto           eGFR     90           Eos #     0.10           Eosinophil %     1.8           Globulin, Total     4.5           Glucose     454           Glucose, UA         >=1000       HDL     18  Comment:   <40 mg/dL: Low HDL  40-60 mg/dL: Normal  >60 mg/dL: Desirable           Hematocrit     39.2           Hemoglobin     13.4           Influenza A   Negative             Ketones, UA         Negative       LDL Calculated     78           LDL/HDL Ratio     4.3           Leukocytes, UA         Negative       Lipase     512           Lymph #     1.35           Lymph %     24.1           MCH     29.1           MCHC     34.2           MCV     85.0           Mono #     0.61           Mono %     10.9           MPV     11.2           Neutrophils, Abs     3.52           Neutrophils Relative     62.7           NITRITE UA         Negative       Non-HDL Cholesterol     136           Occult Blood UA         Negative       Opiate Scrn, Ur         Negative       pH, UA         7.0       Phencyclidine, Urine         Negative       Platelets     208           POC Glucose 369       404         Potassium     3.9           PROTEIN TOTAL     7.6            Protein, UA         Negative       RAPID STREP A SCREEN   Negative             RBC     4.61           RDW     14.1           Sodium     132           Specific [place], UA         1.015       Triglycerides     288  Comment:   Normal:  <150 mg/dL  Borderline High: 150-199 mg/dL  High:   200-499 mg/dL  Very High:  >=500           UROBILINOGEN UA         4.0       VLDL Cholesterol Sandeep     58           WBC     5.61                                  Significant Imaging: CT scan with contrast of abdomen and pelvis revealed interstitial pancreatitis.

## 2023-06-10 NOTE — ED NOTES
Patient transported to the floor via [facility] with family members with all belonging. Patient awake alertx4

## 2023-06-10 NOTE — ASSESSMENT & PLAN NOTE
06/09/2023: Will keep patient NPO for now. Once nausea and vomiting has resolved and abdominal pain improved will start clear liquids and advance as tolerated.  -NPO  - ml/hr   -Dilaudid 1 mg IV every 6 hours as needed for pain  -Zofran 4 mg IV every 6 hours as needed for nausea/vomting  -Protonix 40 mg IV daily  -CBC/CMP in am

## 2023-06-10 NOTE — HPI
"47 y/o WF with PMHx Diabetes is admitted to [facility] through the ED for IV fluids, pain and antiemetics for pancreatitis. ED labs were CBC 5.61, H&H 13.4/39.2, Na 132, K+ 3.9, gap 13, BUN/CR 9/0.81, glucose 454 mg.dL, Lipase 512, , , Total Bili 2.2. Was found to have LUQ pain. CT scan of abdomen/pelvis with contrast revealed interstitial pancreatitis. While in the ED she received one liter of IV NS, then started at 100 ml/hr. Was given Zofran 4 mg IV for nausea with vomiting and Dilaudid 1 mg IV for abdominal pain. Symptoms improved. Patient denied any previous episodes of pancreatitis. She does not have a PCP and has been non-complaint with Tresiba injection for her diabetes. Denies tobacco use or illegal drug use. States has never used IV drugs in the past. Reports had one [employer] today to " help with kidneys".Discussed patient's case, labs, CT scan and medication reconciliation done with Dr. Santos.      "

## 2023-06-10 NOTE — PROGRESS NOTES
"[facility]  Hospital Medicine  Progress Note    Patient Name: Clarice Dupree  MRN: [id number]  Patient Class: OP- Observation   Admission Date: 6/9/2023  Length of Stay: 0 days  Attending Physician: Marialuisa Kenney MD  Primary Care Provider: Primary Doctor No        Subjective:     Principal Problem:Acute pancreatitis without infection or necrosis        HPI:  49 y/o WF with PMHx Diabetes is admitted to [facility] through the ED for IV fluids, pain and antiemetics for pancreatitis. ED labs were CBC 5.61, H&H 13.4/39.2, Na 132, K+ 3.9, gap 13, BUN/CR 9/0.81, glucose 454 mg.dL, Lipase 512, , , Total Bili 2.2. Was found to have LUQ pain. CT scan of abdomen/pelvis with contrast revealed interstitial pancreatitis. While in the ED she received one liter of IV NS, then started at 100 ml/hr. Was given Zofran 4 mg IV for nausea with vomiting and Dilaudid 1 mg IV for abdominal pain. Symptoms improved. Patient denied any previous episodes of pancreatitis. She does not have a PCP and has been non-complaint with Tresiba injection for her diabetes. Denies tobacco use or illegal drug use. States has never used IV drugs in the past. Reports had one [place] [employer] beer today to " help with kidneys".Discussed patient's case, labs, CT scan and medication reconciliation done with Dr. Santos.          Overview/Hospital Course:  06/10/2023 Patient states that her pain is "much better." No tenderness on exam. Labs have improved with WBC down to 3.53, Alk Phos 353, Bili 1.7, , . Patient wishes to start back on a diet. Will advance diet to full liquids and see how she tolerates.       Interval History: progress    Review of Systems   Constitutional: Negative.    HENT: Negative.     Eyes: Negative.    Respiratory: Negative.     Cardiovascular: Negative.    Gastrointestinal: Negative.         Denies any tenderness   Endocrine: Negative.    Genitourinary: " Negative.    Musculoskeletal: Negative.    Skin: Negative.    Allergic/Immunologic: Negative.    Neurological: Negative.    Hematological: Negative.    Psychiatric/Behavioral: Negative.     Objective:     Vital Signs (Most Recent):  Temp: 98.2 °F (36.8 °C) (06/10/23 1200)  Pulse: 79 (06/10/23 1200)  Resp: 18 (06/10/23 1200)  BP: 114/71 (06/10/23 1200)  SpO2: 98 % (06/10/23 1256) Vital Signs (24h Range):  Temp:  [97.7 °F (36.5 °C)-98.6 °F (37 °C)] 98.2 °F (36.8 °C)  Pulse:  [] 79  Resp:  [17-20] 18  SpO2:  [94 %-100 %] 98 %  BP: (100-125)/(61-88) 114/71     Weight: 70.8 kg (156 lb)  Body mass index is 23.04 kg/m².    Intake/Output Summary (Last 24 hours) at 6/10/2023 1329  Last data filed at 6/9/2023 1956  Gross per 24 hour   Intake 2000 ml   Output --   Net 2000 ml         Physical Exam  Vitals and nursing note reviewed.   Constitutional:       General: She is not in acute distress.     Appearance: Normal appearance. She is not ill-appearing, toxic-appearing or diaphoretic.   HENT:      Head: Normocephalic.      Nose: Nose normal.      Mouth/Throat:      Mouth: Mucous membranes are moist.      Pharynx: Oropharynx is clear.   Eyes:      Conjunctiva/sclera: Conjunctivae normal.   Cardiovascular:      Rate and Rhythm: Normal rate and regular rhythm.   Pulmonary:      Effort: Pulmonary effort is normal.   Abdominal:      General: Bowel sounds are normal. There is no distension.      Palpations: Abdomen is soft. There is no mass.      Tenderness: There is no abdominal tenderness. There is no right CVA tenderness, left CVA tenderness, guarding or rebound.      Hernia: No hernia is present.   Musculoskeletal:         General: Normal range of motion.      Cervical back: Normal range of motion.   Skin:     General: Skin is warm and dry.      Capillary Refill: Capillary refill takes less than 2 seconds.   Neurological:      General: No focal deficit present.      Mental Status: She is alert and oriented to person,  place, and time. Mental status is at baseline.      Cranial Nerves: No cranial nerve deficit.      Sensory: No sensory deficit.      Motor: No weakness.      Coordination: Coordination normal.      Gait: Gait normal.      Deep Tendon Reflexes: Reflexes normal.   Psychiatric:         Mood and Affect: Mood normal.         Behavior: Behavior normal.         Thought Content: Thought content normal.         Judgment: Judgment normal.           Significant Labs: All pertinent labs within the past 24 hours have been reviewed.  BMP:   Recent Labs   Lab 06/10/23  0502   *      K 3.9      CO2 30   BUN 5*   CREATININE 0.73   CALCIUM 7.6*     CBC:   Recent Labs   Lab 06/09/23  1714 06/10/23  0503   WBC 5.61 5.41   HGB 13.4 12.5   HCT 39.2 36.9*    198       Significant Imaging: I have reviewed all pertinent imaging results/findings within the past 24 hours.      Assessment/Plan:      * Acute pancreatitis without infection or necrosis  06/09/2023: Will keep patient NPO for now. Once nausea and vomiting has resolved and abdominal pain improved will start clear liquids and advance as tolerated.  -NPO  - ml/hr   -Dilaudid 1 mg IV every 6 hours as needed for pain  -Zofran 4 mg IV every 6 hours as needed for nausea/vomting  -Protonix 40 mg IV daily  -CBC/CMP in am      Type 2 diabetes mellitus, with long-term current use of insulin  Patient's FSGs are uncontrolled due to hyperglycemia on current medication regimen.  Last A1c reviewed- No results found for: LABA1C, HGBA1C  Most recent fingerstick glucose reviewed- No results for input(s): POCTGLUCOSE in the last 24 hours.  Current correctional scale  Medium  Anti-hyperglycemic dose as follows-   Antihyperglycemics (From admission, onward)    Start     Stop Route Frequency Ordered    06/09/23 2215  insulin aspart U-100 injection 1-10 Units         -- SubQ Every 6 hours PRN 06/09/23 2115        Hold Oral hypoglycemics while patient is in the  [facility].    Nausea and vomiting  06/09/2023: Will continue NS at 100 ml/hr and Zofran 4 mg IV every 6 hours as needed for nausea/vomiting.        VTE Risk Mitigation (From admission, onward)         Ordered     IP VTE LOW RISK PATIENT  Once         06/09/23 2115     Place LAKHWINDER hose  Until discontinued         06/09/23 2115     Place sequential compression device  Until discontinued         06/09/23 2115                Discharge Planning   OLENA:      Code Status: Full Code   Is the patient medically ready for discharge?:     Reason for patient still in hospital (select all that apply): Treatment   Patients case was discussed with Dr. Mehta who is the hospitalist on-call for [facility].                     Naeem Leal NP-C  Department of Hospital Medicine   [facility]

## 2023-06-11 VITALS
WEIGHT: 156 LBS | HEIGHT: 69 IN | SYSTOLIC BLOOD PRESSURE: 128 MMHG | HEART RATE: 84 BPM | RESPIRATION RATE: 18 BRPM | TEMPERATURE: 98 F | BODY MASS INDEX: 23.11 KG/M2 | OXYGEN SATURATION: 100 % | DIASTOLIC BLOOD PRESSURE: 70 MMHG

## 2023-06-11 LAB
ALBUMIN SERPL BCP-MCNC: 2.5 G/DL (ref 3.5–5)
ALBUMIN/GLOB SERPL: 0.6 {RATIO}
ALP SERPL-CCNC: 378 U/L (ref 39–100)
ALT SERPL W P-5'-P-CCNC: 781 U/L (ref 13–56)
ANION GAP SERPL CALCULATED.3IONS-SCNC: 8 MMOL/L (ref 7–16)
AST SERPL W P-5'-P-CCNC: 517 U/L (ref 15–37)
BASOPHILS # BLD AUTO: 0.02 K/UL (ref 0–0.2)
BASOPHILS NFR BLD AUTO: 0.4 % (ref 0–1)
BILIRUB SERPL-MCNC: 2.3 MG/DL (ref ?–1.2)
BUN SERPL-MCNC: 4 MG/DL (ref 7–18)
BUN/CREAT SERPL: 6 (ref 6–20)
CALCIUM SERPL-MCNC: 7.8 MG/DL (ref 8.5–10.1)
CHLORIDE SERPL-SCNC: 102 MMOL/L (ref 98–107)
CO2 SERPL-SCNC: 29 MMOL/L (ref 21–32)
CREAT SERPL-MCNC: 0.7 MG/DL (ref 0.55–1.02)
DIFFERENTIAL METHOD BLD: ABNORMAL
EGFR (NO RACE VARIABLE) (RUSH/TITUS): 108 ML/MIN/1.73M2
EOSINOPHIL # BLD AUTO: 0.12 K/UL (ref 0–0.5)
EOSINOPHIL NFR BLD AUTO: 2.7 % (ref 1–4)
ERYTHROCYTE [DISTWIDTH] IN BLOOD BY AUTOMATED COUNT: 14.5 % (ref 11.5–14.5)
GLOBULIN SER-MCNC: 4 G/DL (ref 2–4)
GLUCOSE SERPL-MCNC: 361 MG/DL (ref 74–106)
HCT VFR BLD AUTO: 37.5 % (ref 38–47)
HGB BLD-MCNC: 12.5 G/DL (ref 12–16)
LYMPHOCYTES # BLD AUTO: 1.29 K/UL (ref 1–4.8)
LYMPHOCYTES NFR BLD AUTO: 28.9 % (ref 27–41)
MCH RBC QN AUTO: 28.6 PG (ref 27–31)
MCHC RBC AUTO-ENTMCNC: 33.3 G/DL (ref 32–36)
MCV RBC AUTO: 85.8 FL (ref 80–96)
MONOCYTES # BLD AUTO: 0.56 K/UL (ref 0–0.8)
MONOCYTES NFR BLD AUTO: 12.5 % (ref 2–6)
MPC BLD CALC-MCNC: 11.1 FL (ref 9.4–12.4)
NEUTROPHILS # BLD AUTO: 2.48 K/UL (ref 1.8–7.7)
NEUTROPHILS NFR BLD AUTO: 55.5 % (ref 53–65)
PLATELET # BLD AUTO: 206 K/UL (ref 150–400)
POTASSIUM SERPL-SCNC: 3.8 MMOL/L (ref 3.5–5.1)
PROT SERPL-MCNC: 6.5 G/DL (ref 6.4–8.2)
RBC # BLD AUTO: 4.37 M/UL (ref 4.2–5.4)
SODIUM SERPL-SCNC: 135 MMOL/L (ref 136–145)
WBC # BLD AUTO: 4.47 K/UL (ref 4.5–11)

## 2023-06-11 PROCEDURE — 63600175 PHARM REV CODE 636 W HCPCS: Performed by: REGISTERED NURSE

## 2023-06-11 PROCEDURE — 96361 HYDRATE IV INFUSION ADD-ON: CPT

## 2023-06-11 PROCEDURE — G0378 HOSPITAL OBSERVATION PER HR: HCPCS

## 2023-06-11 PROCEDURE — C9113 INJ PANTOPRAZOLE SODIUM, VIA: HCPCS | Performed by: NURSE PRACTITIONER

## 2023-06-11 PROCEDURE — 99239 PR HOSPITAL DISCHARGE DAY,>30 MIN: ICD-10-PCS | Mod: ,,, | Performed by: FAMILY MEDICINE

## 2023-06-11 PROCEDURE — 25000003 PHARM REV CODE 250: Performed by: NURSE PRACTITIONER

## 2023-06-11 PROCEDURE — 96376 TX/PRO/DX INJ SAME DRUG ADON: CPT

## 2023-06-11 PROCEDURE — 63600175 PHARM REV CODE 636 W HCPCS: Performed by: NURSE PRACTITIONER

## 2023-06-11 PROCEDURE — 96372 THER/PROPH/DIAG INJ SC/IM: CPT | Performed by: REGISTERED NURSE

## 2023-06-11 PROCEDURE — 80053 COMPREHEN METABOLIC PANEL: CPT | Performed by: NURSE PRACTITIONER

## 2023-06-11 PROCEDURE — 94761 N-INVAS EAR/PLS OXIMETRY MLT: CPT

## 2023-06-11 PROCEDURE — 99239 HOSP IP/OBS DSCHRG MGMT >30: CPT | Mod: ,,, | Performed by: FAMILY MEDICINE

## 2023-06-11 PROCEDURE — 85025 COMPLETE CBC W/AUTO DIFF WBC: CPT | Performed by: NURSE PRACTITIONER

## 2023-06-11 RX ORDER — ONDANSETRON 4 MG/1
4 TABLET, ORALLY DISINTEGRATING ORAL 2 TIMES DAILY
Qty: 14 TABLET | Refills: 0 | Status: SHIPPED | OUTPATIENT
Start: 2023-06-11 | End: 2023-06-18

## 2023-06-11 RX ADMIN — SODIUM CHLORIDE: 9 INJECTION, SOLUTION INTRAVENOUS at 03:06

## 2023-06-11 RX ADMIN — INSULIN ASPART 10 UNITS: 100 INJECTION, SOLUTION INTRAVENOUS; SUBCUTANEOUS at 05:06

## 2023-06-11 RX ADMIN — PANTOPRAZOLE SODIUM 40 MG: 40 INJECTION, POWDER, FOR SOLUTION INTRAVENOUS at 08:06

## 2023-06-11 NOTE — ED PROVIDER NOTES
Encounter Date: 2023       History     Chief Complaint   Patient presents with    Dysuria    Fever     PT WITH ABDOMINAL PAIN. HX OF PANCREATITIS IN THE PAST. REPORTS N/V AND DIARRHEA.     The history is provided by the patient.   Review of patient's allergies indicates:   Allergen Reactions    Phenergan [promethazine] Other (See Comments)     VIOLENT     Floxin [ofloxacin]     Penicillins     Reglan [metoclopramide]      Past Medical History:   Diagnosis Date    Diabetes mellitus     DVT (deep venous thrombosis)     Nausea and vomiting 2023    Other pulmonary embolism without acute cor pulmonale      Past Surgical History:   Procedure Laterality Date    BLADDER SURGERY       SECTION      HYSTERECTOMY       Family History   Problem Relation Age of Onset    Cancer Mother     Diabetes Mother     Hypertension Mother     Cancer Father     Hypertension Father     Diabetes Father      Social History     Tobacco Use    Smoking status: Never    Smokeless tobacco: Current     Types: Snuff   Substance Use Topics    Alcohol use: Yes     Alcohol/week: 1.0 standard drink     Types: 1 Cans of beer per week     Comment: occasional    Drug use: Not Currently     Types: Methamphetamines     Review of Systems   Constitutional:  Positive for fatigue and fever.   Gastrointestinal:  Positive for abdominal distention, abdominal pain and nausea.     Physical Exam     Initial Vitals [23 1640]   BP Pulse Resp Temp SpO2   122/88 105 18 98 °F (36.7 °C) 97 %      MAP       --         Physical Exam    Constitutional: She appears well-developed and well-nourished.   HENT:   Head: Normocephalic.   Eyes: Pupils are equal, round, and reactive to light.   Neck: Neck supple.   Normal range of motion.  Cardiovascular:  Normal rate and regular rhythm.           Abdominal: Abdomen is soft.   Musculoskeletal:         General: Normal range of motion.      Cervical back: Normal range of motion and neck supple.     Neurological: She is  oriented to person, place, and time.   Skin: Skin is warm.   Psychiatric: She has a normal mood and affect.       Medical Screening Exam   See Full Note    ED Course   Procedures  Labs Reviewed   URINALYSIS, REFLEX TO URINE CULTURE - Abnormal; Notable for the following components:       Result Value    Glucose, UA >=1000 (*)     Urobilinogen, UA 4.0 (*)     All other components within normal limits   COMPREHENSIVE METABOLIC PANEL - Abnormal; Notable for the following components:    Sodium 132 (*)     Chloride 97 (*)     Glucose 454 (*)     Calcium 8.0 (*)     Albumin 3.1 (*)     Globulin 4.5 (*)     Bilirubin, Total 2.2 (*)     Alk Phos 419 (*)      (*)      (*)     All other components within normal limits   DRUG SCREEN, URINE (BEAKER) - Abnormal; Notable for the following components:    Amphetamine, Urine Positive (*)     All other components within normal limits   LIPASE - Abnormal; Notable for the following components:    Lipase 512 (*)     All other components within normal limits   CBC WITH DIFFERENTIAL - Abnormal; Notable for the following components:    Lymphocytes % 24.1 (*)     Monocytes % 10.9 (*)     All other components within normal limits   LIPID PANEL - Abnormal; Notable for the following components:    Triglycerides 288 (*)     HDL Cholesterol 18 (*)     All other components within normal limits   POCT GLUCOSE MONITORING CONTINUOUS - Abnormal; Notable for the following components:    POC Glucose 404 (*)     All other components within normal limits   POCT GLUCOSE MONITORING CONTINUOUS - Abnormal; Notable for the following components:    POC Glucose 369 (*)     All other components within normal limits   THROAT SCREEN, RAPID STREP - Normal   SARS-COV2 (COVID) W/ FLU ANTIGEN - Normal    Narrative:     Negative SARS-CoV results should not be used as the sole basis for treatment or patient management decisions; negative results should be considered in the context of a patient's recent  exposures, history and the presene of clinical signs and symptoms consistent with COVID-19.  Negative results should be treated as presumptive and confirmed by molecular assay, if necessary for patient management.   CBC W/ AUTO DIFFERENTIAL    Narrative:     The following orders were created for panel order CBC auto differential.  Procedure                               Abnormality         Status                     ---------                               -----------         ------                     CBC with Differential[[id number]]        Abnormal            Final result                 Please view results for these tests on the individual orders.          Imaging Results              CT Abdomen Pelvis With Contrast (Final result)  Result time 06/09/23 18:23:25      Final result by Sridhar Varela DO (06/09/23 18:23:25)                   Impression:      Interstitial pancreatitis.  No pseudocyst.      Electronically signed by: Sridhar Varela  Date:    06/09/2023  Time:    18:23               Narrative:    EXAMINATION:  CT ABDOMEN PELVIS WITH CONTRAST    CLINICAL HISTORY:  Nausea/vomiting;Elevarted lipase, elevated liver enzymes;    COMPARISON:  None.    TECHNIQUE:  CT ABDOMEN PELVIS WITH [id number] cc of Isovue 370    FINDINGS:  Lower lobes: Clear.    Cardiac: No effusion.    Abdomen:    Hepatobiliary/gallbladder: Normal    Spleen: Normal    Pancreas: Mild fat stranding surrounding the pancreas.    Adrenal/Genitourinary system: Normal    Bowel and Mesentery: There is no evidence for bowel obstruction.    Peritoneum: Normal.    Retroperitoneum: No enlarged lymph nodes.    Vasculature: Normal.    Reproductive: Uterus removed.  Right ovary is normal.    Lymph nodes: No enlarged lymph nodes.    Abdominal wall: Normal.    Osseous structures: Normal.                                       Medications   sodium chloride 0.9% bolus 1,000 mL 1,000 mL (0 mLs Intravenous Stopped 6/9/23 9907)   ondansetron injection 4 mg  (4 mg Intravenous Given 6/9/23 1716)   iopamidoL (ISOVUE-370) injection 100 mL (100 mLs Intravenous Given 6/9/23 1806)   0.9%  NaCl infusion (0 mLs Intravenous Stopped 6/9/23 1956)   HYDROmorphone (PF) injection 1 mg (1 mg Intravenous Given 6/9/23 1947)   0.9%  NaCl infusion (1,000 mLs Intravenous New Bag 6/9/23 1947)     Medical Decision Making:   ED Management:  Abdominal pain  Ct abdomen pancreatitis  Admitted for obs                       Clinical Impression:   Final diagnoses:  [K85.90] Acute pancreatitis without infection or necrosis, unspecified pancreatitis type (Primary)  [R11.2] Nausea and vomiting, unspecified vomiting type        ED Disposition Condition    Observation Stable                Marialuisa Kenney MD  07/11/23 1610

## 2023-06-11 NOTE — DISCHARGE SUMMARY
"[facility]  Hospital Medicine  Discharge Summary      Patient Name: Clarice Dupree  MRN: [id number]  NATACHA: [id number]  Patient Class: OP- Observation  Admission Date: 2023  Hospital Length of Stay: 0 days  Discharge Date and Time:  2023 9:35 AM  Attending Physician: Marialuisa Kenney MD   Discharging Provider: Marialuisa Kenney MD  Primary Care Provider: Primary Doctor No    Primary Care Team: Networked reference to record PCT     HPI:   47 y/o WF with PMHx Diabetes is admitted to [facility] through the ED for IV fluids, pain and antiemetics for pancreatitis. ED labs were CBC 5.61, H&H 13.4/39.2, Na 132, K+ 3.9, gap 13, BUN/CR 9/0.81, glucose 454 mg.dL, Lipase 512, , , Total Bili 2.2. Was found to have LUQ pain. CT scan of abdomen/pelvis with contrast revealed interstitial pancreatitis. While in the ED she received one liter of IV NS, then started at 100 ml/hr. Was given Zofran 4 mg IV for nausea with vomiting and Dilaudid 1 mg IV for abdominal pain. Symptoms improved. Patient denied any previous episodes of pancreatitis. She does not have a PCP and has been non-complaint with Tresiba injection for her diabetes. Denies tobacco use or illegal drug use. States has never used IV drugs in the past. Reports had one [place] [employer] beer today to " help with kidneys".Discussed patient's case, labs, CT scan and medication reconciliation done with Dr. Santos.          * No surgery found *      Hospital Course:   06/10/2023 Patient states that her pain is "much better." No tenderness on exam. Labs have improved with WBC down to 3.53, Alk Phos 353, Bili 1.7, , . Patient wishes to start back on a diet. Will advance diet to full liquids and see how she tolerates.       2023: Pt states she feels. Has tolerated oral intake since yesterday. Reports has not needed pain medicine and had no nausea or vomiting. Pt has  to attend today. Will " discharge home with zofran and follow up with PCP.        Goals of Care Treatment Preferences:  Code Status: Full Code      Consults:   Consults (From admission, onward)        Status Ordering Provider     Inpatient consult to Registered Dietitian/Nutritionist  Once        Provider:  (Not yet assigned)    Acknowledged AGUSTÍN WILHELM     IP consult to case management  Once        Provider:  (Not yet assigned)    Acknowledged ARNOLDO MARQUEZ          No new Assessment & Plan notes have been filed under this hospital service since the last note was generated.  Service: Hospital Medicine    Final Active Diagnoses:    Diagnosis Date Noted POA    PRINCIPAL PROBLEM:  Acute pancreatitis without infection or necrosis [K85.90] 06/09/2023 Yes    Nausea and vomiting [R11.2] 06/09/2023 Yes    Type 2 diabetes mellitus, with long-term current use of insulin [E11.9, Z79.4] 06/09/2023 Not Applicable      Problems Resolved During this Admission:       Discharged Condition: good    Disposition:     Follow Up:   Follow-up Information     Darline Eubanks NP. Call.    Specialty: Family Medicine  Contact information:  [address] [place] [id number]  [phone]                       Patient Instructions:   No discharge procedures on file.    Significant Diagnostic Studies: Labs:   CMP   Recent Labs   Lab 06/09/23  1714 06/10/23  0502 06/11/23  0456   * 137 135*   K 3.9 3.9 3.8   CL 97* 104 102   CO2 26 30 29   * 265* 361*   BUN 9 5* 4*   CREATININE 0.81 0.73 0.70   CALCIUM 8.0* 7.6* 7.8*   PROT 7.6 6.5 6.5   ALBUMIN 3.1* 2.6* 2.5*   BILITOT 2.2* 1.7* 2.3*   ALKPHOS 419* 353* 378*   * 466* 517*   * 808* 781*   ANIONGAP 13 7 8   , CBC   Recent Labs   Lab 06/09/23  1714 06/10/23  0503 06/11/23  0455   WBC 5.61 5.41 4.47*   HGB 13.4 12.5 12.5   HCT 39.2 36.9* 37.5*    198 206   , Lipid Panel   Lab Results   Component Value Date    CHOL 154 06/09/2023    HDL 18 (L) 06/09/2023    LDLCALC 78  06/09/2023    TRIG 288 (H) 06/09/2023    CHOLHDL 8.6 06/09/2023    and A1C:   Recent Labs   Lab 06/09/23  1714   HGBA1C 11.0*       Pending Diagnostic Studies:     Procedure Component Value Units Date/Time    US Abdomen Complete [[id number]]     Order Status: Sent Lab Status: No result          Medications:  Reconciled Home Medications:      Medication List      START taking these medications    ondansetron 4 MG Tbdl  Commonly known as: ZOFRAN-ODT  Take 1 tablet (4 mg total) by mouth 2 (two) times daily. for 7 days        CONTINUE taking these medications    TRESIBA U-100 INSULIN SUBQ  Inject into the skin.        STOP taking these medications    aspirin 81 MG EC tablet  Commonly known as: ECOTRIN            Indwelling Lines/Drains at time of discharge:   Lines/Drains/Airways     None                 Time spent on the discharge of patient: 31 minutes         Marialuisa Kenney MD  Department of Hospital Medicine  [facility]

## 2023-06-11 NOTE — PLAN OF CARE
Problem: Adult Inpatient Plan of Care  Goal: Plan of Care Review  Outcome: Ongoing, Progressing     Problem: Adult Inpatient Plan of Care  Goal: Patient-Specific Goal (Individualized)  Outcome: Ongoing, Progressing     Problem: Adult Inpatient Plan of Care  Goal: Absence of Hospital-Acquired Illness or Injury  Outcome: Ongoing, Progressing     Problem: Adult Inpatient Plan of Care  Goal: Optimal Comfort and Wellbeing  Outcome: Ongoing, Progressing     Problem: Diabetes Comorbidity  Goal: Blood Glucose Level Within Targeted Range  Outcome: Ongoing, Progressing     Problem: Pain (Pancreatitis)  Goal: Acceptable Pain Control  Outcome: Ongoing, Progressing

## 2023-06-11 NOTE — PLAN OF CARE
Patient's SAT this AM was 98% on room air        Problem: Respiratory Compromise (Pancreatitis)  Goal: Effective Oxygenation and Ventilation  Outcome: Ongoing, Progressing

## 2023-06-11 NOTE — DISCHARGE INSTRUCTIONS
Discharge instructions reviewed with patient. Instructed to make follow up appointment. Patient verbalized understanding.

## 2023-06-11 NOTE — PLAN OF CARE
Care plan met. Adequate for discharge  Problem: Adult Inpatient Plan of Care  Goal: Plan of Care Review  Outcome: Met  Goal: Patient-Specific Goal (Individualized)  Outcome: Met  Goal: Absence of Hospital-Acquired Illness or Injury  Outcome: Met  Goal: Optimal Comfort and Wellbeing  Outcome: Met  Goal: Readiness for Transition of Care  Outcome: Met     Problem: Diabetes Comorbidity  Goal: Blood Glucose Level Within Targeted Range  Outcome: Met     Problem: Pain Acute  Goal: Acceptable Pain Control and Functional Ability  Outcome: Met     Problem: Fluid Imbalance (Pancreatitis)  Goal: Fluid Balance  Outcome: Met     Problem: Infection (Pancreatitis)  Goal: Infection Symptom Resolution  Outcome: Met     Problem: Nutrition Impaired (Pancreatitis)  Goal: Optimal Nutrition Intake  Outcome: Met     Problem: Pain (Pancreatitis)  Goal: Acceptable Pain Control  Outcome: Met     Problem: Respiratory Compromise (Pancreatitis)  Goal: Effective Oxygenation and Ventilation  Outcome: Met

## 2023-06-11 NOTE — NURSING
Discharge instructions reviewed with patient. Instructed to make follow up appointment as ordered. Patient verbalized understanding.

## 2023-07-05 ENCOUNTER — HOSPITAL ENCOUNTER (OUTPATIENT)
Dept: RADIOLOGY | Facility: HOSPITAL | Age: 48
Discharge: HOME | End: 2023-07-05
Attending: REGISTERED NURSE

## 2023-07-05 ENCOUNTER — HOSPITAL ENCOUNTER (EMERGENCY)
Facility: HOSPITAL | Age: 48
Discharge: ANOTHER HOSPITAL | End: 2023-07-05

## 2023-07-05 ENCOUNTER — HOSPITAL ENCOUNTER (INPATIENT)
Facility: HOSPITAL | Age: 48
LOS: 2 days | Discharge: LEFT BEFORE DISCHARGE | DRG: 441 | End: 2023-07-07
Attending: HOSPITALIST | Admitting: HOSPITALIST

## 2023-07-05 VITALS
HEIGHT: 69 IN | TEMPERATURE: 99 F | WEIGHT: 143 LBS | HEART RATE: 60 BPM | SYSTOLIC BLOOD PRESSURE: 116 MMHG | OXYGEN SATURATION: 97 % | DIASTOLIC BLOOD PRESSURE: 90 MMHG | RESPIRATION RATE: 16 BRPM | BODY MASS INDEX: 21.18 KG/M2

## 2023-07-05 DIAGNOSIS — K72.90 LIVER FAILURE: ICD-10-CM

## 2023-07-05 DIAGNOSIS — R79.89 ELEVATED LFTS: ICD-10-CM

## 2023-07-05 DIAGNOSIS — K72.00 ACUTE LIVER FAILURE WITHOUT HEPATIC COMA: Primary | ICD-10-CM

## 2023-07-05 DIAGNOSIS — R07.9 CHEST PAIN: ICD-10-CM

## 2023-07-05 DIAGNOSIS — N39.0 URINARY TRACT INFECTION WITHOUT HEMATURIA, SITE UNSPECIFIED: ICD-10-CM

## 2023-07-05 DIAGNOSIS — R17 JAUNDICE: ICD-10-CM

## 2023-07-05 DIAGNOSIS — B17.9 ACUTE HEPATITIS: ICD-10-CM

## 2023-07-05 PROBLEM — Z86.711 HISTORY OF PULMONARY EMBOLISM: Status: ACTIVE | Noted: 2023-07-05

## 2023-07-05 PROBLEM — N30.00 ACUTE CYSTITIS WITHOUT HEMATURIA: Status: ACTIVE | Noted: 2023-07-05

## 2023-07-05 PROBLEM — R11.2 NAUSEA AND VOMITING: Status: RESOLVED | Noted: 2023-06-09 | Resolved: 2023-07-05

## 2023-07-05 LAB
ALBUMIN SERPL BCP-MCNC: 2 G/DL (ref 3.5–5)
ALBUMIN/GLOB SERPL: 0.5 {RATIO}
ALP SERPL-CCNC: 224 U/L (ref 39–100)
ALT SERPL W P-5'-P-CCNC: 1645 U/L (ref 13–56)
AMPHET UR QL SCN: NEGATIVE
ANION GAP SERPL CALCULATED.3IONS-SCNC: 12 MMOL/L (ref 7–16)
ANISOCYTOSIS BLD QL SMEAR: ABNORMAL
APAP SERPL-MCNC: <2 ΜG/ML (ref 10–30)
APAP SERPL-MCNC: <2 ΜG/ML (ref 10–30)
AST SERPL W P-5'-P-CCNC: 2355 U/L (ref 15–37)
BACTERIA #/AREA URNS HPF: ABNORMAL /HPF
BARBITURATES UR QL SCN: NEGATIVE
BASOPHILS # BLD AUTO: 0.03 K/UL (ref 0–0.2)
BASOPHILS NFR BLD AUTO: 0.5 % (ref 0–1)
BENZODIAZ METAB UR QL SCN: NEGATIVE
BILIRUB SERPL-MCNC: 17.5 MG/DL (ref ?–1.2)
BILIRUB UR QL STRIP: ABNORMAL
BUN SERPL-MCNC: 8 MG/DL (ref 7–18)
BUN/CREAT SERPL: 10 (ref 6–20)
CALCIUM SERPL-MCNC: 8.1 MG/DL (ref 8.5–10.1)
CANNABINOIDS UR QL SCN: NEGATIVE
CHLORIDE SERPL-SCNC: 96 MMOL/L (ref 98–107)
CLARITY UR: ABNORMAL
CO2 SERPL-SCNC: 26 MMOL/L (ref 21–32)
COCAINE UR QL SCN: NEGATIVE
COLOR UR: ABNORMAL
CREAT SERPL-MCNC: 0.79 MG/DL (ref 0.55–1.02)
DIFFERENTIAL METHOD BLD: ABNORMAL
EGFR (NO RACE VARIABLE) (RUSH/TITUS): 93 ML/MIN/1.73M2
EOSINOPHIL # BLD AUTO: 0.04 K/UL (ref 0–0.5)
EOSINOPHIL NFR BLD AUTO: 0.7 % (ref 1–4)
EOSINOPHIL NFR BLD MANUAL: 2 % (ref 1–4)
ERYTHROCYTE [DISTWIDTH] IN BLOOD BY AUTOMATED COUNT: 19.4 % (ref 11.5–14.5)
GLOBULIN SER-MCNC: 4.3 G/DL (ref 2–4)
GLUCOSE SERPL-MCNC: 246 MG/DL (ref 70–105)
GLUCOSE SERPL-MCNC: 328 MG/DL (ref 74–106)
GLUCOSE SERPL-MCNC: 416 MG/DL (ref 70–105)
GLUCOSE UR STRIP-MCNC: >=1000 MG/DL
HCT VFR BLD AUTO: 33.7 % (ref 38–47)
HGB BLD-MCNC: 12.4 G/DL (ref 12–16)
INR BLD: 1.48
KETONES UR STRIP-SCNC: NEGATIVE MG/DL
LACTATE SERPL-SCNC: 1.9 MMOL/L (ref 0.4–2)
LEUKOCYTE ESTERASE UR QL STRIP: ABNORMAL
LIPASE SERPL-CCNC: 903 U/L (ref 73–393)
LYMPHOCYTES # BLD AUTO: 1.03 K/UL (ref 1–4.8)
LYMPHOCYTES NFR BLD AUTO: 18.9 % (ref 27–41)
LYMPHOCYTES NFR BLD MANUAL: 17 % (ref 27–41)
MCH RBC QN AUTO: 29.7 PG (ref 27–31)
MCHC RBC AUTO-ENTMCNC: 36.8 G/DL (ref 32–36)
MCV RBC AUTO: 80.8 FL (ref 80–96)
MONOCYTES # BLD AUTO: 0.94 K/UL (ref 0–0.8)
MONOCYTES NFR BLD AUTO: 17.2 % (ref 2–6)
MONOCYTES NFR BLD MANUAL: 9 % (ref 2–6)
MPC BLD CALC-MCNC: 12.2 FL (ref 9.4–12.4)
NEUTROPHILS # BLD AUTO: 3.42 K/UL (ref 1.8–7.7)
NEUTROPHILS NFR BLD AUTO: 62.7 % (ref 53–65)
NEUTS BAND NFR BLD MANUAL: 2 % (ref 1–5)
NEUTS SEG NFR BLD MANUAL: 70 % (ref 50–62)
NITRITE UR QL STRIP: POSITIVE
NRBC BLD MANUAL-RTO: ABNORMAL %
OPIATES UR QL SCN: NEGATIVE
PCP UR QL SCN: NEGATIVE
PH UR STRIP: 6 PH UNITS
PLATELET # BLD AUTO: 197 K/UL (ref 150–400)
POIKILOCYTOSIS BLD QL SMEAR: ABNORMAL
POTASSIUM SERPL-SCNC: 3.7 MMOL/L (ref 3.5–5.1)
PROT SERPL-MCNC: 6.3 G/DL (ref 6.4–8.2)
PROT UR QL STRIP: NEGATIVE
PROTHROMBIN TIME: 17.8 SECONDS (ref 11.7–14.7)
RBC # BLD AUTO: 4.17 M/UL (ref 4.2–5.4)
RBC # UR STRIP: NEGATIVE /UL
RBC #/AREA URNS HPF: ABNORMAL /HPF
SODIUM SERPL-SCNC: 130 MMOL/L (ref 136–145)
SP GR UR STRIP: 1.02
SQUAMOUS #/AREA URNS LPF: ABNORMAL /LPF
TARGETS BLD QL SMEAR: ABNORMAL
UROBILINOGEN UR STRIP-ACNC: 0.2 MG/DL
WBC # BLD AUTO: 5.46 K/UL (ref 4.5–11)
WBC #/AREA URNS HPF: ABNORMAL /HPF
WBC CLUMPS, UA: ABNORMAL /HPF

## 2023-07-05 PROCEDURE — 99285 EMERGENCY DEPT VISIT HI MDM: CPT | Mod: ,,, | Performed by: NURSE PRACTITIONER

## 2023-07-05 PROCEDURE — 99285 EMERGENCY DEPT VISIT HI MDM: CPT | Mod: 25

## 2023-07-05 PROCEDURE — 63600175 PHARM REV CODE 636 W HCPCS: Performed by: NURSE PRACTITIONER

## 2023-07-05 PROCEDURE — 82962 GLUCOSE BLOOD TEST: CPT

## 2023-07-05 PROCEDURE — 99285 PR EMERGENCY DEPT VISIT,LEVEL V: ICD-10-PCS | Mod: ,,, | Performed by: NURSE PRACTITIONER

## 2023-07-05 PROCEDURE — 85025 COMPLETE CBC W/AUTO DIFF WBC: CPT | Performed by: NURSE PRACTITIONER

## 2023-07-05 PROCEDURE — 87077 CULTURE AEROBIC IDENTIFY: CPT | Performed by: NURSE PRACTITIONER

## 2023-07-05 PROCEDURE — 81001 URINALYSIS AUTO W/SCOPE: CPT | Performed by: NURSE PRACTITIONER

## 2023-07-05 PROCEDURE — 83605 ASSAY OF LACTIC ACID: CPT | Performed by: NURSE PRACTITIONER

## 2023-07-05 PROCEDURE — 76700 US EXAM ABDOM COMPLETE: CPT | Mod: TC

## 2023-07-05 PROCEDURE — 96361 HYDRATE IV INFUSION ADD-ON: CPT

## 2023-07-05 PROCEDURE — 11000001 HC ACUTE MED/SURG PRIVATE ROOM

## 2023-07-05 PROCEDURE — 96375 TX/PRO/DX INJ NEW DRUG ADDON: CPT

## 2023-07-05 PROCEDURE — 80053 COMPREHEN METABOLIC PANEL: CPT | Performed by: NURSE PRACTITIONER

## 2023-07-05 PROCEDURE — 96365 THER/PROPH/DIAG IV INF INIT: CPT

## 2023-07-05 PROCEDURE — 83690 ASSAY OF LIPASE: CPT | Performed by: NURSE PRACTITIONER

## 2023-07-05 PROCEDURE — 87086 URINE CULTURE/COLONY COUNT: CPT | Performed by: NURSE PRACTITIONER

## 2023-07-05 PROCEDURE — 96366 THER/PROPH/DIAG IV INF ADDON: CPT

## 2023-07-05 PROCEDURE — 25000003 PHARM REV CODE 250: Performed by: NURSE PRACTITIONER

## 2023-07-05 PROCEDURE — 80143 DRUG ASSAY ACETAMINOPHEN: CPT | Performed by: NURSE PRACTITIONER

## 2023-07-05 PROCEDURE — 99223 1ST HOSP IP/OBS HIGH 75: CPT | Mod: ,,, | Performed by: INTERNAL MEDICINE

## 2023-07-05 PROCEDURE — 96367 TX/PROPH/DG ADDL SEQ IV INF: CPT

## 2023-07-05 PROCEDURE — 63600175 PHARM REV CODE 636 W HCPCS

## 2023-07-05 PROCEDURE — 99223 PR INITIAL HOSPITAL CARE,LEVL III: ICD-10-PCS | Mod: ,,, | Performed by: INTERNAL MEDICINE

## 2023-07-05 PROCEDURE — 80143 DRUG ASSAY ACETAMINOPHEN: CPT

## 2023-07-05 PROCEDURE — 85610 PROTHROMBIN TIME: CPT | Performed by: NURSE PRACTITIONER

## 2023-07-05 PROCEDURE — 80307 DRUG TEST PRSMV CHEM ANLYZR: CPT | Performed by: NURSE PRACTITIONER

## 2023-07-05 RX ORDER — ONDANSETRON 2 MG/ML
4 INJECTION INTRAMUSCULAR; INTRAVENOUS
Status: COMPLETED | OUTPATIENT
Start: 2023-07-05 | End: 2023-07-05

## 2023-07-05 RX ORDER — HYDRALAZINE HYDROCHLORIDE 20 MG/ML
10 INJECTION INTRAMUSCULAR; INTRAVENOUS EVERY 8 HOURS PRN
Status: DISCONTINUED | OUTPATIENT
Start: 2023-07-05 | End: 2023-07-07 | Stop reason: HOSPADM

## 2023-07-05 RX ORDER — HYDROMORPHONE HYDROCHLORIDE 2 MG/ML
1 INJECTION, SOLUTION INTRAMUSCULAR; INTRAVENOUS; SUBCUTANEOUS EVERY 6 HOURS PRN
Status: DISCONTINUED | OUTPATIENT
Start: 2023-07-05 | End: 2023-07-07 | Stop reason: HOSPADM

## 2023-07-05 RX ORDER — PANTOPRAZOLE SODIUM 40 MG/1
40 TABLET, DELAYED RELEASE ORAL DAILY
Status: DISCONTINUED | OUTPATIENT
Start: 2023-07-06 | End: 2023-07-07 | Stop reason: HOSPADM

## 2023-07-05 RX ORDER — ONDANSETRON 2 MG/ML
4 INJECTION INTRAMUSCULAR; INTRAVENOUS EVERY 8 HOURS PRN
Status: DISCONTINUED | OUTPATIENT
Start: 2023-07-05 | End: 2023-07-07 | Stop reason: HOSPADM

## 2023-07-05 RX ORDER — NALOXONE HCL 0.4 MG/ML
0.02 VIAL (ML) INJECTION
Status: DISCONTINUED | OUTPATIENT
Start: 2023-07-05 | End: 2023-07-07 | Stop reason: HOSPADM

## 2023-07-05 RX ORDER — SODIUM CHLORIDE 9 MG/ML
INJECTION, SOLUTION INTRAVENOUS CONTINUOUS
Status: DISCONTINUED | OUTPATIENT
Start: 2023-07-05 | End: 2023-07-07 | Stop reason: HOSPADM

## 2023-07-05 RX ORDER — INSULIN ASPART 100 [IU]/ML
1-10 INJECTION, SOLUTION INTRAVENOUS; SUBCUTANEOUS
Status: DISCONTINUED | OUTPATIENT
Start: 2023-07-05 | End: 2023-07-07 | Stop reason: HOSPADM

## 2023-07-05 RX ORDER — GLUCAGON 1 MG
1 KIT INJECTION
Status: DISCONTINUED | OUTPATIENT
Start: 2023-07-05 | End: 2023-07-07 | Stop reason: HOSPADM

## 2023-07-05 RX ORDER — IBUPROFEN 400 MG/1
400 TABLET ORAL EVERY 6 HOURS PRN
Status: DISCONTINUED | OUTPATIENT
Start: 2023-07-05 | End: 2023-07-05

## 2023-07-05 RX ORDER — SIMETHICONE 80 MG
1 TABLET,CHEWABLE ORAL 4 TIMES DAILY PRN
Status: DISCONTINUED | OUTPATIENT
Start: 2023-07-05 | End: 2023-07-07 | Stop reason: HOSPADM

## 2023-07-05 RX ORDER — SODIUM CHLORIDE 0.9 % (FLUSH) 0.9 %
10 SYRINGE (ML) INJECTION EVERY 12 HOURS PRN
Status: DISCONTINUED | OUTPATIENT
Start: 2023-07-05 | End: 2023-07-07 | Stop reason: HOSPADM

## 2023-07-05 RX ADMIN — CEFTRIAXONE 1 G: 1 INJECTION, POWDER, FOR SOLUTION INTRAMUSCULAR; INTRAVENOUS at 02:07

## 2023-07-05 RX ADMIN — ONDANSETRON 4 MG: 2 INJECTION INTRAMUSCULAR; INTRAVENOUS at 11:07

## 2023-07-05 RX ADMIN — ACETYLCYSTEINE 3200 MG: 6 INJECTION, SOLUTION INTRAVENOUS at 04:07

## 2023-07-05 RX ADMIN — SODIUM CHLORIDE 1000 ML: 9 INJECTION, SOLUTION INTRAVENOUS at 11:07

## 2023-07-05 RX ADMIN — INSULIN ASPART 5 UNITS: 100 INJECTION, SOLUTION INTRAVENOUS; SUBCUTANEOUS at 11:07

## 2023-07-05 RX ADMIN — ACETYLCYSTEINE 9700 MG: 6 INJECTION, SOLUTION INTRAVENOUS at 02:07

## 2023-07-05 NOTE — ED PROVIDER NOTES
Encounter Date: 2023       History     Chief Complaint   Patient presents with    Jaundice     47 y/o WF with PMHx DM, DVT, PE and Pancreatitis presents to the ED by POV with complaint of abdominal pan, nausea with vomiting, diarrhea and being jaundice since she was discharged from [facility] on . Patient had outpatient abdominal ultrasound ordered today and was sent to the ED due to being jaundice with worsening of symptoms. Patient was admitted on  for pancreatitis and discharged on on . She did not follow up with PCP after hospital discharge. Reports having generalized abdominal pain that is worse in bilateral upper quads. Last meal: mixed vegetables last night. Last vomited this morning. Last bm was loose stool last night. Denies fever or dysuria. Had gallbladder ultrasound today.  Denies tobacco use or illegal drug use. States has never used IV drugs in the past. Denies illegal drug use. Reports had one [employer] [place] [employer] [place] beer on , but has not had any alcohol since then. Has taken about 10 tabs of 500 mg of Tylenol over the past 2 weeks. Denies hx of hepatitis, had negative hepatitis panel 2022.     The history is provided by the patient and the spouse.   Review of patient's allergies indicates:   Allergen Reactions    Phenergan [promethazine] Other (See Comments)     VIOLENT     Floxin [ofloxacin]     Penicillins     Reglan [metoclopramide]      Past Medical History:   Diagnosis Date    Diabetes mellitus     DVT (deep venous thrombosis)     Other pulmonary embolism without acute cor pulmonale      Past Surgical History:   Procedure Laterality Date    BLADDER SURGERY       SECTION      HYSTERECTOMY       Family History   Problem Relation Age of Onset    Cancer Mother     Diabetes Mother     Hypertension Mother     Cancer Father     Hypertension Father     Diabetes Father      Social History     Tobacco Use    Smoking status: Never    Smokeless tobacco: Current      Types: Snuff   Substance Use Topics    Alcohol use: Yes     Alcohol/week: 1.0 standard drink     Types: 1 Cans of beer per week     Comment: occasional    Drug use: Not Currently     Types: Methamphetamines     Review of Systems   Constitutional:  Positive for appetite change and fatigue. Negative for activity change, chills, diaphoresis and fever.   HENT: Negative.  Negative for congestion, ear pain, sinus pressure, sinus pain, sore throat and trouble swallowing.    Eyes:  Negative for photophobia, itching and visual disturbance.        Eyes yellow   Respiratory: Negative.  Negative for cough and shortness of breath.    Cardiovascular: Negative.    Gastrointestinal:  Positive for abdominal pain, diarrhea, nausea and vomiting.   Genitourinary: Negative.  Negative for dysuria, frequency, hematuria and pelvic pain.   Musculoskeletal: Negative.    Skin:  Positive for color change (jaundice).   Neurological: Negative.  Negative for dizziness, weakness, light-headedness and headaches.   Hematological: Negative.    Psychiatric/Behavioral: Negative.       Physical Exam     Initial Vitals [07/05/23 1100]   BP Pulse Resp Temp SpO2   104/73 71 18 98.5 °F (36.9 °C) 98 %      MAP       --         Physical Exam    Nursing note and vitals reviewed.  Constitutional: Vital signs are normal. She appears well-developed and well-nourished. She is cooperative. She appears ill. No distress.   HENT:   Nose: Nose normal.   Mouth/Throat: Uvula is midline and oropharynx is clear and moist. Mucous membranes are dry.   Eyes: Lids are normal. Pupils are equal, round, and reactive to light.   Bilateral sclera icterus noted   Neck: Neck supple.   Normal range of motion.   Full passive range of motion without pain.     Cardiovascular:  Normal rate, regular rhythm, normal heart sounds, intact distal pulses and normal pulses.           No edema to BLE   Pulmonary/Chest: Effort normal and breath sounds normal.   Abdominal: Abdomen is soft. Bowel  sounds are normal. There is generalized abdominal tenderness.   Has generalized abdomina pain that is worse over bilateral upper quads of abdomen.  There is no rebound and no guarding.   Musculoskeletal:         General: Normal range of motion.      Cervical back: Full passive range of motion without pain, normal range of motion and neck supple.     Lymphadenopathy:     She has no cervical adenopathy.   Neurological: She is alert and oriented to person, place, and time. She has normal strength.   Skin: Skin is warm, dry and intact. Capillary refill takes less than 2 seconds.   Skin is jaundice   Psychiatric: She has a normal mood and affect. Her speech is normal and behavior is normal. Judgment and thought content normal.       Medical Screening Exam   See Full Note    ED Course   Procedures  Labs Reviewed   COMPREHENSIVE METABOLIC PANEL - Abnormal; Notable for the following components:       Result Value    Sodium 130 (*)     Chloride 96 (*)     Glucose 328 (*)     Calcium 8.1 (*)     Total Protein 6.3 (*)     Albumin 2.0 (*)     Globulin 4.3 (*)     Bilirubin, Total 17.5 (*)     Alk Phos 224 (*)     ALT 1,645 (*)     AST 2,355 (*)     All other components within normal limits   LIPASE - Abnormal; Notable for the following components:    Lipase 903 (*)     All other components within normal limits   URINALYSIS, REFLEX TO URINE CULTURE - Abnormal; Notable for the following components:    Color, UA Brown (*)     Leukocytes, UA Trace (*)     Nitrites, UA Positive (*)     Glucose, UA >=1000 (*)     Bilirubin, UA Large (*)     All other components within normal limits   CBC WITH DIFFERENTIAL - Abnormal; Notable for the following components:    RBC 4.17 (*)     Hematocrit 33.7 (*)     MCHC 36.8 (*)     RDW 19.4 (*)     Lymphocytes % 18.9 (*)     Monocytes % 17.2 (*)     Eosinophils % 0.7 (*)     Monocytes, Absolute 0.94 (*)     All other components within normal limits   MANUAL DIFFERENTIAL - Abnormal; Notable for the  following components:    Segmented Neutrophils, Man % 70 (*)     Lymphocytes, Man % 17 (*)     Monocytes, Man % 9 (*)     All other components within normal limits   ACETAMINOPHEN LEVEL - Abnormal; Notable for the following components:    Acetaminophen <2 (*)     All other components within normal limits   URINALYSIS, MICROSCOPIC - Abnormal; Notable for the following components:    WBC, UA 20-50 (*)     Bacteria, UA Loaded (*)     Squamous Epithelial Cells, UA Few (*)     WBC Clumps Few (*)     All other components within normal limits   PROTIME-INR - Abnormal; Notable for the following components:    PT 17.8 (*)     All other components within normal limits   POCT GLUCOSE MONITORING CONTINUOUS - Abnormal; Notable for the following components:    POC Glucose 246 (*)     All other components within normal limits   LACTIC ACID, PLASMA - Normal   DRUG SCREEN, URINE (BEAKER) - Normal   CULTURE, URINE   CBC W/ AUTO DIFFERENTIAL    Narrative:     The following orders were created for panel order CBC auto differential.  Procedure                               Abnormality         Status                     ---------                               -----------         ------                     CBC with Differential[[id number]]        Abnormal            Final result               Manual Differential[[id number]]          Abnormal            Final result                 Please view results for these tests on the individual orders.   POCT GLUCOSE MONITORING CONTINUOUS          Imaging Results    None          Medications   acetylcysteine 20% (200 mg/ml) (ACETADOTE) 3,200 mg in dextrose 5 % (D5W) 500 mL infusion (3,200 mg Intravenous New Bag 7/5/23 1617)   sodium chloride 0.9% bolus 1,000 mL 1,000 mL (0 mLs Intravenous Stopped 7/5/23 1247)   ondansetron injection 4 mg (4 mg Intravenous Given 7/5/23 1148)   cefTRIAXone (ROCEPHIN) 1 g in dextrose 5 % in water (D5W) 5 % 100 mL IVPB (MB+) (0 g Intravenous Stopped 7/5/23 1458)    acetylcysteine 20% (200 mg/ml) (ACETADOTE) 9,700 mg in dextrose 5 % (D5W) 250 mL infusion (0 mg Intravenous Stopped 7/5/23 5289)     Medical Decision Making:   History:   Old Records Summarized: records from previous admission(s).       <> Summary of Records: -6/9 Admission to [facility] for pancreatitis. Labs from 6/9: A1C 11.0%,  CBC 5.61, H&H 13.4/39.2, , , K+ 4.56, BUN/CR 9/0.81, Total Bili 2.2, alk Phos 419, , . Total chol 154, HDL 18, LDL 78, Trigs 288. Previous UDS was + for amphetamine.  CT scan at that time revealed interstitial pancreatitis.  No pseudocyst.  -Had negative Hep panel in 11/14/2022.  Clinical Tests:   Lab Tests: Ordered and Reviewed  The following lab test(s) were unremarkable: CBC, CMP, Lactate, Lipase and Urinalysis  Radiological Study: Ordered and Reviewed  ED Management:  MDM  Patient presents for emergent evaluation of generalized abdominal pain, nausea with vomiting, diarrhea and skin is jaundice that poses a threat to life and/or bodily function.    In the ED patient found to have VS wnl. NAD distress noted. Bilateral sclera icterus. Skin is jaundice. Oral mucus membranes dry. BS + x 4 quads, soft with generalized tenderness that is worse over bilateral upper quads. No rebound or guarding noted.   I ordered labs and personally reviewed them.  Labs: WBC 5.46, H&H 12.4/233.7, , Corrected Na is 134, K+ 3.7, BUN/CR 8/0.79, glucose 328 mg/dL, Gap 12, corrected Ca++ 10.5, Lactate 1.9, Total bili 17.5, AST 2,355, ALT 1,645, ALK Phose 224. Acetaminophen level < 2, INR 1.48.  UA: UDS  Abdominal ultrasound:  Small biliary sludge noted. Mild-to-moderate gallbladder wall thickening without significant abnormal gallbladder distension. Consider nuclear medicine a patent biliary imaging study for further evaluation.    Discharge Admit  I discussed the patient presentation labs and abdominal ultrasound results with the consultant with Dr. Myron Lyons. Also  reviewed previous admission labs and CT scan from that time.  Patient was managed in the ED with IV NS liter bolus and Zofran 4 mg IV x 1.  -Rocephin 1 gm IV x 1 given for UTI.  The response to treatment was nausea improved. Glucose down to 246 mg/dL after fluids bolus.   -Notified [facility]/Dr. Lyons of INR 1.48.   -Dr. Villaseñor accepted patient for admission to [facility] and recommended to start NAC  -NAC loading dose given over one hour. NAC second dose infusing over 4 hours..  Secure chart with [facility], waiting on bed placement.  -[facility] called, patient to room 561,    Other:   I have discussed this case with another health care provider.       <> Summary of the Discussion: Spoke with Dr. Lyons at [facility] regarding transfer for GI evaluation. He will speak with gastroenterologist regarding patient's case and will call me back.   Secure Chat with Dr. Lyons who has spoken with Dr. Villaseñor (GI). Will check INR. If less than 2 may admit to [facility]. If INR is greater than 2 will need to be transferred to [facility] for acute liver failure.                           Clinical Impression:   Final diagnoses:  [R17] Jaundice  [R79.89] Elevated LFTs  [N39.0] Urinary tract infection without hematuria, site unspecified  [K72.00] Acute liver failure without hepatic coma (Primary)        ED Disposition Condition    Transfer to Another Facility Stable                         Leyla Whipple, FADUMO  07/05/23 5387

## 2023-07-05 NOTE — Clinical Note
"Dr. Lyons/Dr. Villaseñor accepted patient to [facility].  Patient has become agitated. States that she has been here for more than 8 hours and that she is "tired of waiting." States that "if the ambulance can't take me now, I'm leaving." I d iscussed the need for medications and monitoring including EMS transfer to [facility]. I also explained that we have called [facility] multiple times and called again just now with dispatch telling the nursing staff that no EMS trucks were available and  that they could not give us a time. This was relayed to patient and her significant other at this time who state that they are leaving. I discussed risks of leaving against medical advice including worsening liver failure and death. Patient who is AA OX3 verbalizes understanding and signed out AMA at this time. "

## 2023-07-05 NOTE — ED TRIAGE NOTES
Presents after out patient ultrasound of abdomen.  Recently discharged from [facility] with acute pancreatitis.  Missed ultrasound appointment last Monday.  Reports constant nausea, decreased appetite and generalized weakness.  Has lost control of bowels and is in incontinent brief.

## 2023-07-06 PROBLEM — B17.9 ACUTE HEPATITIS: Status: ACTIVE | Noted: 2023-07-06

## 2023-07-06 PROBLEM — K76.82 HEPATIC ENCEPHALOPATHY: Status: ACTIVE | Noted: 2023-07-06

## 2023-07-06 PROBLEM — K85.90 ACUTE PANCREATITIS WITHOUT INFECTION OR NECROSIS: Status: RESOLVED | Noted: 2023-06-09 | Resolved: 2023-07-06

## 2023-07-06 PROBLEM — K72.90 LIVER FAILURE: Status: RESOLVED | Noted: 2023-07-05 | Resolved: 2023-07-06

## 2023-07-06 LAB
ALBUMIN SERPL BCP-MCNC: 1.9 G/DL (ref 3.5–5)
ALP SERPL-CCNC: 226 U/L (ref 39–100)
ALT SERPL W P-5'-P-CCNC: 1615 U/L (ref 13–56)
AMMONIA PLAS-SCNC: 36 ΜMOL/L (ref 11–32)
ANA SER QL: POSITIVE
ANION GAP SERPL CALCULATED.3IONS-SCNC: 8 MMOL/L (ref 7–16)
ANTI-MITOCHONDRIAL (M2) AB INDEX: 0.1
AST SERPL W P-5'-P-CCNC: 2199 U/L (ref 15–37)
BASOPHILS # BLD AUTO: 0.05 K/UL (ref 0–0.2)
BASOPHILS NFR BLD AUTO: 0.8 % (ref 0–1)
BILIRUB DIRECT SERPL-MCNC: 14.5 MG/DL (ref 0–0.2)
BILIRUB SERPL-MCNC: 18.2 MG/DL (ref ?–1.2)
BUN SERPL-MCNC: 6 MG/DL (ref 7–18)
BUN/CREAT SERPL: 9 (ref 6–20)
CALCIUM SERPL-MCNC: 7.7 MG/DL (ref 8.5–10.1)
CERULOPLASMIN SERPL-MCNC: 33.2 MG/DL
CHLORIDE SERPL-SCNC: 98 MMOL/L (ref 98–107)
CO2 SERPL-SCNC: 28 MMOL/L (ref 21–32)
CREAT SERPL-MCNC: 0.64 MG/DL (ref 0.55–1.02)
DIFFERENTIAL METHOD BLD: ABNORMAL
DSDNA IGG SERPL IA-ACNC: 8 IU (ref 0–24)
DSDNA IGG SERPL IA-ACNC: NEGATIVE [IU]/ML
EGFR (NO RACE VARIABLE) (RUSH/TITUS): 110 ML/MIN/1.73M2
ENA AB SER QL IA: 6.6 EUS (ref 0–19.9)
ENA AB SER QL IA: NEGATIVE
EOSINOPHIL # BLD AUTO: 0.05 K/UL (ref 0–0.5)
EOSINOPHIL NFR BLD AUTO: 0.8 % (ref 1–4)
ERYTHROCYTE [DISTWIDTH] IN BLOOD BY AUTOMATED COUNT: 19.8 % (ref 11.5–14.5)
ETHANOL, BLOOD (CATEGORY): NOT DETECTED
FERRITIN SERPL-MCNC: 1672 NG/ML (ref 8–252)
GGT SERPL-CCNC: 150 U/L (ref 5–55)
GLUCOSE SERPL-MCNC: 162 MG/DL (ref 70–105)
GLUCOSE SERPL-MCNC: 258 MG/DL (ref 70–105)
GLUCOSE SERPL-MCNC: 288 MG/DL (ref 70–105)
GLUCOSE SERPL-MCNC: 315 MG/DL (ref 74–106)
HAV IGM SER QL: ABNORMAL
HBV CORE IGM SER QL: REACTIVE
HBV SURFACE AG SERPL QL IA: REACTIVE
HCT VFR BLD AUTO: 34.6 % (ref 38–47)
HCV AB SER QL: ABNORMAL
HGB BLD-MCNC: 12.4 G/DL (ref 12–16)
HGB FRACT BLD ELPH-IMP: NEGATIVE
HIV 1+O+2 AB SERPL QL: NORMAL
IMM GRANULOCYTES # BLD AUTO: 0.06 K/UL (ref 0–0.04)
IMM GRANULOCYTES NFR BLD: 1 % (ref 0–0.4)
INR BLD: 1.29
IRON SATN MFR SERPL: 65 % (ref 14–50)
IRON SERPL-MCNC: 160 ΜG/DL (ref 50–170)
LYMPHOCYTES # BLD AUTO: 1.16 K/UL (ref 1–4.8)
LYMPHOCYTES NFR BLD AUTO: 18.8 % (ref 27–41)
MAGNESIUM SERPL-MCNC: 1.5 MG/DL (ref 1.7–2.3)
MCH RBC QN AUTO: 29.3 PG (ref 27–31)
MCHC RBC AUTO-ENTMCNC: 35.8 G/DL (ref 32–36)
MCV RBC AUTO: 81.8 FL (ref 80–96)
MONOCYTES # BLD AUTO: 0.97 K/UL (ref 0–0.8)
MONOCYTES NFR BLD AUTO: 15.7 % (ref 2–6)
MPC BLD CALC-MCNC: 12.7 FL (ref 9.4–12.4)
NEUTROPHILS # BLD AUTO: 3.89 K/UL (ref 1.8–7.7)
NEUTROPHILS NFR BLD AUTO: 62.9 % (ref 53–65)
NRBC # BLD AUTO: 0 X10E3/UL
NRBC, AUTO (.00): 0 %
PHOSPHATE SERPL-MCNC: 3.2 MG/DL (ref 2.5–4.5)
PLATELET # BLD AUTO: 189 K/UL (ref 150–400)
POTASSIUM SERPL-SCNC: 3.9 MMOL/L (ref 3.5–5.1)
PROT SERPL-MCNC: 5.9 G/DL (ref 6.4–8.2)
PROTHROMBIN TIME: 15.9 SECONDS (ref 11.7–14.7)
RBC # BLD AUTO: 4.23 M/UL (ref 4.2–5.4)
SODIUM SERPL-SCNC: 130 MMOL/L (ref 136–145)
TIBC SERPL-MCNC: 246 ΜG/DL (ref 250–450)
WBC # BLD AUTO: 6.18 K/UL (ref 4.5–11)

## 2023-07-06 PROCEDURE — 86038 ANTINUCLEAR ANTIBODIES: CPT | Performed by: INTERNAL MEDICINE

## 2023-07-06 PROCEDURE — 99233 SBSQ HOSP IP/OBS HIGH 50: CPT | Mod: ,,, | Performed by: STUDENT IN AN ORGANIZED HEALTH CARE EDUCATION/TRAINING PROGRAM

## 2023-07-06 PROCEDURE — 82077 ASSAY SPEC XCP UR&BREATH IA: CPT | Performed by: STUDENT IN AN ORGANIZED HEALTH CARE EDUCATION/TRAINING PROGRAM

## 2023-07-06 PROCEDURE — 63600175 PHARM REV CODE 636 W HCPCS

## 2023-07-06 PROCEDURE — 82962 GLUCOSE BLOOD TEST: CPT

## 2023-07-06 PROCEDURE — 86225 DNA ANTIBODY NATIVE: CPT | Performed by: INTERNAL MEDICINE

## 2023-07-06 PROCEDURE — 85610 PROTHROMBIN TIME: CPT | Performed by: INTERNAL MEDICINE

## 2023-07-06 PROCEDURE — 87389 HIV-1 AG W/HIV-1&-2 AB AG IA: CPT | Performed by: INTERNAL MEDICINE

## 2023-07-06 PROCEDURE — 82140 ASSAY OF AMMONIA: CPT

## 2023-07-06 PROCEDURE — 82728 ASSAY OF FERRITIN: CPT | Performed by: INTERNAL MEDICINE

## 2023-07-06 PROCEDURE — 25000003 PHARM REV CODE 250

## 2023-07-06 PROCEDURE — 83516 IMMUNOASSAY NONANTIBODY: CPT | Performed by: INTERNAL MEDICINE

## 2023-07-06 PROCEDURE — 11000001 HC ACUTE MED/SURG PRIVATE ROOM

## 2023-07-06 PROCEDURE — 84100 ASSAY OF PHOSPHORUS: CPT

## 2023-07-06 PROCEDURE — 80048 BASIC METABOLIC PNL TOTAL CA: CPT | Performed by: INTERNAL MEDICINE

## 2023-07-06 PROCEDURE — 99223 1ST HOSP IP/OBS HIGH 75: CPT | Mod: ,,, | Performed by: INTERNAL MEDICINE

## 2023-07-06 PROCEDURE — 87340 HEPATITIS B SURFACE AG IA: CPT | Mod: 90 | Performed by: INTERNAL MEDICINE

## 2023-07-06 PROCEDURE — 82977 ASSAY OF GGT: CPT | Performed by: INTERNAL MEDICINE

## 2023-07-06 PROCEDURE — 83540 ASSAY OF IRON: CPT | Performed by: INTERNAL MEDICINE

## 2023-07-06 PROCEDURE — 80074 ACUTE HEPATITIS PANEL: CPT | Performed by: INTERNAL MEDICINE

## 2023-07-06 PROCEDURE — 86235 NUCLEAR ANTIGEN ANTIBODY: CPT | Performed by: INTERNAL MEDICINE

## 2023-07-06 PROCEDURE — 83735 ASSAY OF MAGNESIUM: CPT

## 2023-07-06 PROCEDURE — 82390 ASSAY OF CERULOPLASMIN: CPT | Performed by: INTERNAL MEDICINE

## 2023-07-06 PROCEDURE — 80321 ALCOHOLS BIOMARKERS 1OR 2: CPT | Performed by: STUDENT IN AN ORGANIZED HEALTH CARE EDUCATION/TRAINING PROGRAM

## 2023-07-06 PROCEDURE — 99223 PR INITIAL HOSPITAL CARE,LEVL III: ICD-10-PCS | Mod: ,,, | Performed by: INTERNAL MEDICINE

## 2023-07-06 PROCEDURE — 83550 IRON BINDING TEST: CPT | Performed by: INTERNAL MEDICINE

## 2023-07-06 PROCEDURE — 86015 ACTIN ANTIBODY EACH: CPT | Mod: 90 | Performed by: INTERNAL MEDICINE

## 2023-07-06 PROCEDURE — 80076 HEPATIC FUNCTION PANEL: CPT | Performed by: INTERNAL MEDICINE

## 2023-07-06 PROCEDURE — 85025 COMPLETE CBC W/AUTO DIFF WBC: CPT

## 2023-07-06 PROCEDURE — 87340 HEPATITIS B SURFACE AG IA: CPT | Performed by: INTERNAL MEDICINE

## 2023-07-06 PROCEDURE — 99233 PR SUBSEQUENT HOSPITAL CARE,LEVL III: ICD-10-PCS | Mod: ,,, | Performed by: STUDENT IN AN ORGANIZED HEALTH CARE EDUCATION/TRAINING PROGRAM

## 2023-07-06 RX ORDER — HYDROMORPHONE HYDROCHLORIDE 2 MG/1
2 TABLET ORAL EVERY 4 HOURS PRN
Status: DISCONTINUED | OUTPATIENT
Start: 2023-07-06 | End: 2023-07-07 | Stop reason: HOSPADM

## 2023-07-06 RX ADMIN — SODIUM CHLORIDE: 9 INJECTION, SOLUTION INTRAVENOUS at 09:07

## 2023-07-06 RX ADMIN — INSULIN ASPART 6 UNITS: 100 INJECTION, SOLUTION INTRAVENOUS; SUBCUTANEOUS at 11:07

## 2023-07-06 RX ADMIN — ONDANSETRON 4 MG: 2 INJECTION INTRAMUSCULAR; INTRAVENOUS at 09:07

## 2023-07-06 RX ADMIN — CEFTRIAXONE SODIUM 1 G: 1 INJECTION, POWDER, FOR SOLUTION INTRAMUSCULAR; INTRAVENOUS at 09:07

## 2023-07-06 RX ADMIN — INSULIN DETEMIR 10 UNITS: 100 INJECTION, SOLUTION SUBCUTANEOUS at 09:07

## 2023-07-06 RX ADMIN — HYDROMORPHONE HYDROCHLORIDE 2 MG: 2 TABLET ORAL at 04:07

## 2023-07-06 RX ADMIN — PANTOPRAZOLE SODIUM 40 MG: 40 TABLET, DELAYED RELEASE ORAL at 09:07

## 2023-07-06 RX ADMIN — INSULIN ASPART 2 UNITS: 100 INJECTION, SOLUTION INTRAVENOUS; SUBCUTANEOUS at 04:07

## 2023-07-06 NOTE — NURSING
0715 Received patient lying in bed, awake alert and oriented x4. No acute distress noted. Denies pain. Assessment complete at this time. Discussed plan of care. Instructed pt to call with needs. Left pt resting in bed, call bell in reach, safety measures in place.    0819 Philip, RN called GI consult- GI stated there wasn't any immediate intervention and pt could eat from their standpoint. Notified Dr. Villaseñor who ordered for patient to remain NPO.    0935 pt sitting up in chair. Reports she has been vomiting in trash can. Pt had no IV access per night RN, multiple attempts were unsuccessful, ultrasound included. 22 gauge started to right forearm x1 attempt. Fluids and antibiotics started, zofran given. See MAR. Left pt in bed with safety measures in place.     1156 pt resting in bed on left side with eyes closed. Aroused easily. Denies current needs. See MAR for med admin. Call bell in reach.     1328 pt resting in bed, SCD's applied per order. Denies needs. IV infusing. Safety measures in place.     1530 pt lying in bed, requesting something to eat. Discussed with patient NPO orders still active. She states she gets sicker when she does not eat and wants broth or something. Informed pt that when RN attempted to get diet order earlier this AM, the MD ordered pt to remain NPO. Stated I would message MD again and see if we can get the order changed. She stated that the MD that was in here earlier saw her sick and didn't care to change the order then. Pt proceeds to say that she will walk to the kitchen and get her own food. Education provided on need to follow MD orders. Pt grabs phone in room and attempts to call her , then throws phone down, in direction of this RN and snatches her SCD's off her legs.  Instructed pt that I would reach out to her MD, but aggression towards staff would not be tolerated. Pt requested AMA papers at this time. Notified JEFF Palacios charge nurse of situation.     1610 transferred care to  JEFF Jimenez.

## 2023-07-06 NOTE — H&P
[facility] - [address] [place] [address] Medicine  History & Physical    Patient Name: Clarice Dupree  MRN: [id number]  Patient Class: IP- Inpatient  Admission Date: 7/5/2023  Attending Physician: Jordy Santoro MD   Primary Care Provider: Primary Doctor No         Patient information was obtained from patient, spouse/SO and ER records.     Subjective:     Principal Problem:Liver failure    Chief Complaint:   Chief Complaint   Patient presents with    Nausea    Diarrhea        HPI: This is a 48 y.o female who presents to [facility] ED today with c/o abdominal pain, nausea with vomiting, diarrhea and jaundice. Patient states she was recently discharged from [facility] on 6/11 and was supposed to follow up with her PCP and get an RUQ US but she forgot. Patient states that yesterday her nausea and vomiting increased and she needed to be seen today. She went in for her RUQ ultrasound and they sent her to the ED because she has diffuse Jaundice over her body. Patient was transferred to [facility] for higher level of care. Patient denies fever, shortness of breath or chest pain.    Patient has a history of T2DM, DVT, PE and Pancreatitis. Patient was discharged recently after being treated for pancreatitis. Patient states she has not used meth in 6 years. She had one beer about a month ago. She has taken tylenol over the past 2 weeks. She lives at home with her . She does endorse being more fatigued over the last month. She can complete all her ADLs with no assistance.    In the ED, initial presenting vitals were T. 99.7, P 77, R 18, /77 O2 sat 98 percent. Imaging completed includes US abdomen impression show a small biliary sludge noted. Mild-to-moderate gallbladder wall thickening without significant abnormal gallbladder distension. Pertinent labs were H/H 12.4/33/7, Na 130, glucose 328, PT 17.8  INR 1.48. Alk phos 224, AST 2,355 ALT 1645 and lipase 903. Acetaminophen level  was less than 2 and UA positive for UTI.  Medications given in the ED include Zofran 4 mg, 1L NS bolus, Rocephin IVPB, and N-acetylcysteine(Acetodote) IVPB.    Patient was admitted to [facility] medicine for the continued care and medical management of this patient.      Past Medical History:   Diagnosis Date    Diabetes mellitus     DVT (deep venous thrombosis)     Other pulmonary embolism without acute cor pulmonale        Past Surgical History:   Procedure Laterality Date    BLADDER SURGERY       SECTION      HYSTERECTOMY         Review of patient's allergies indicates:   Allergen Reactions    Phenergan [promethazine] Other (See Comments)     VIOLENT     Floxin [ofloxacin]     Penicillins     Reglan [metoclopramide]        Current Facility-Administered Medications on File Prior to Encounter   Medication    [COMPLETED] acetylcysteine 20% (200 mg/ml) (ACETADOTE) 9,700 mg in dextrose 5 % (D5W) 250 mL infusion    [COMPLETED] cefTRIAXone (ROCEPHIN) 1 g in dextrose 5 % in water (D5W) 5 % 100 mL IVPB (MB+)    [COMPLETED] ondansetron injection 4 mg    [COMPLETED] sodium chloride 0.9% bolus 1,000 mL 1,000 mL    [COMPLETED] acetylcysteine 20% (200 mg/ml) (ACETADOTE) 3,200 mg in dextrose 5 % (D5W) 500 mL infusion     Current Outpatient Medications on File Prior to Encounter   Medication Sig    insulin degludec (TRESIBA U-100 INSULIN SUBQ) Inject into the skin. 14-20 units per night by sliding scale     Family History       Problem Relation (Age of Onset)    Cancer Mother, Father    Diabetes Mother, Father    Hypertension Mother, Father          Tobacco Use    Smoking status: Never    Smokeless tobacco: Current     Types: Snuff   Substance and Sexual Activity    Alcohol use: Yes     Alcohol/week: 1.0 standard drink     Types: 1 Cans of beer per week     Comment: occasional    Drug use: Not Currently     Types: Methamphetamines    Sexual activity: Yes     Review of Systems   Constitutional:  Positive for  fatigue.   HENT:  Negative for congestion, hearing loss, mouth sores and nosebleeds.    Eyes:  Negative for discharge and visual disturbance.   Respiratory:  Negative for cough and shortness of breath.    Cardiovascular:  Negative for chest pain and leg swelling.   Gastrointestinal:  Positive for abdominal pain, diarrhea, nausea and vomiting.   Endocrine: Negative for cold intolerance, heat intolerance and polydipsia.   Genitourinary:  Negative for difficulty urinating and dysuria.   Musculoskeletal:  Negative for arthralgias.   Allergic/Immunologic: Negative.    Neurological:  Positive for weakness and headaches.   Hematological: Negative.    Psychiatric/Behavioral: Negative.     Objective:     Vital Signs (Most Recent):  Temp: 99.2 °F (37.3 °C) (07/05/23 2030)  Pulse: 77 (07/05/23 2030)  Resp: 18 (07/05/23 2030)  BP: 125/77 (07/05/23 2030)  SpO2: 98 % (07/05/23 2030) Vital Signs (24h Range):  Temp:  [98.5 °F (36.9 °C)-99.2 °F (37.3 °C)] 99.2 °F (37.3 °C)  Pulse:  [60-77] 77  Resp:  [16-18] 18  SpO2:  [97 %-98 %] 98 %  BP: ()/(52-90) 125/77     Weight: 77.7 kg (171 lb 4.8 oz)  Body mass index is 24.58 kg/m².     Physical Exam  Constitutional:       Appearance: She is normal weight. She is ill-appearing.   HENT:      Head: Normocephalic.      Right Ear: Tympanic membrane normal.      Left Ear: Tympanic membrane normal.      Nose: Nose normal.      Mouth/Throat:      Mouth: Mucous membranes are moist.      Pharynx: Oropharynx is clear.   Eyes:      General: Scleral icterus present.   Cardiovascular:      Rate and Rhythm: Normal rate and regular rhythm.      Pulses: Normal pulses.      Heart sounds: Normal heart sounds.   Pulmonary:      Effort: Pulmonary effort is normal.      Breath sounds: Normal breath sounds.   Abdominal:      Tenderness: There is abdominal tenderness.   Musculoskeletal:         General: Normal range of motion.   Skin:     General: Skin is warm and dry.      Capillary Refill: Capillary  refill takes less than 2 seconds.      Coloration: Skin is jaundiced.   Neurological:      Mental Status: She is alert and oriented to person, place, and time.   Psychiatric:         Behavior: Behavior normal.              Significant Labs: All pertinent labs within the past 24 hours have been reviewed.  BMP:   Recent Labs   Lab 07/05/23  1136   *   *   K 3.7   CL 96*   CO2 26   BUN 8   CREATININE 0.79   CALCIUM 8.1*     CBC:   Recent Labs   Lab 07/05/23  1136   WBC 5.46   HGB 12.4   HCT 33.7*          Significant Imaging: I have reviewed all pertinent imaging results/findings within the past 24 hours.    Assessment/Plan:     * Liver failure  Patient recently discharged for pancreatitis 6/11  Alk phos- 224,AST 2,355, ALT 1,645, Lipase 903. Acetaminophen level < 2.   US abdomen- Small biliary sludge noted. Mild-to-moderate gallbladder wall thickening without significant abnormal gallbladder distension  Hepatic US with doppler pending- to R/O portal vein thrombosis  Pending labs: Ammonia, AMA, Anti-smooth muscle antibody, Ceruloplasmin, Ferritin, Gamma GT, Hepatic Function panel, Hepatitis panel, HIV, IgA, IgG, IgM, PT/INR, Iron and TIBC  GI consulted thank them for their assistance        Acute pancreatitis without infection or necrosis  Michael's Criteria 2 points   Abdominal tenderness with nausea and vomiting noted  Lipase 903  Recently discharge with Pancreatitis  /cc  NPO after midnight  GI consulted      Acute cystitis without hematuria  Postive UA  Urine Culture pending  Ceftriaxone 1G IVPB ordered      History of pulmonary embolism  Patient states she has not taken her Xarelto in two years  INR 1.48. Will hold anticoagulation for now.      Type 2 diabetes mellitus, with long-term current use of insulin  Patient's FSGs are uncontrolled due to hyperglycemia on current medication regimen.  Last A1c reviewed-   Lab Results   Component Value Date    HGBA1C 11.0 (H) 06/09/2023     Most  recent fingerstick glucose reviewed- No results for input(s): POCTGLUCOSE in the last 24 hours.  Current correctional scale  Medium  Maintain anti-hyperglycemic dose as follows-   Antihyperglycemics (From admission, onward)      Start     Stop Route Frequency Ordered    07/06/23 2100  insulin detemir U-100 injection 10 Units         -- SubQ Nightly 07/05/23 2156 07/05/23 2246  insulin aspart U-100 injection 1-10 Units         -- SubQ Before meals & nightly PRN 07/05/23 2156          Hold Oral hypoglycemics while patient is in the hospital.      VTE Risk Mitigation (From admission, onward)           Ordered     IP VTE LOW RISK PATIENT  Once         07/05/23 2156     Place sequential compression device  Until discontinued         07/05/23 2156                               Dennis Ray MD  Department of Hospital Medicine  [facility] - [address] [place] [address]

## 2023-07-06 NOTE — SUBJECTIVE & OBJECTIVE
Past Medical History:   Diagnosis Date    Diabetes mellitus     DVT (deep venous thrombosis)     Other pulmonary embolism without acute cor pulmonale        Past Surgical History:   Procedure Laterality Date    BLADDER SURGERY       SECTION      HYSTERECTOMY         Review of patient's allergies indicates:   Allergen Reactions    Phenergan [promethazine] Other (See Comments)     VIOLENT     Floxin [ofloxacin]     Penicillins     Reglan [metoclopramide]        Current Facility-Administered Medications on File Prior to Encounter   Medication    [COMPLETED] acetylcysteine 20% (200 mg/ml) (ACETADOTE) 9,700 mg in dextrose 5 % (D5W) 250 mL infusion    [COMPLETED] cefTRIAXone (ROCEPHIN) 1 g in dextrose 5 % in water (D5W) 5 % 100 mL IVPB (MB+)    [COMPLETED] ondansetron injection 4 mg    [COMPLETED] sodium chloride 0.9% bolus 1,000 mL 1,000 mL    [COMPLETED] acetylcysteine 20% (200 mg/ml) (ACETADOTE) 3,200 mg in dextrose 5 % (D5W) 500 mL infusion     Current Outpatient Medications on File Prior to Encounter   Medication Sig    insulin degludec (TRESIBA U-100 INSULIN SUBQ) Inject into the skin. 14-20 units per night by sliding scale     Family History       Problem Relation (Age of Onset)    Cancer Mother, Father    Diabetes Mother, Father    Hypertension Mother, Father          Tobacco Use    Smoking status: Never    Smokeless tobacco: Current     Types: Snuff   Substance and Sexual Activity    Alcohol use: Yes     Alcohol/week: 1.0 standard drink     Types: 1 Cans of beer per week     Comment: occasional    Drug use: Not Currently     Types: Methamphetamines    Sexual activity: Yes     Review of Systems   Constitutional:  Positive for fatigue.   HENT:  Negative for congestion, hearing loss, mouth sores and nosebleeds.    Eyes:  Negative for discharge and visual disturbance.   Respiratory:  Negative for cough and shortness of breath.    Cardiovascular:  Negative for chest pain and leg swelling.   Gastrointestinal:   Positive for abdominal pain, diarrhea, nausea and vomiting.   Endocrine: Negative for cold intolerance, heat intolerance and polydipsia.   Genitourinary:  Negative for difficulty urinating and dysuria.   Musculoskeletal:  Negative for arthralgias.   Allergic/Immunologic: Negative.    Neurological:  Positive for weakness and headaches.   Hematological: Negative.    Psychiatric/Behavioral: Negative.     Objective:     Vital Signs (Most Recent):  Temp: 99.2 °F (37.3 °C) (07/05/23 2030)  Pulse: 77 (07/05/23 2030)  Resp: 18 (07/05/23 2030)  BP: 125/77 (07/05/23 2030)  SpO2: 98 % (07/05/23 2030) Vital Signs (24h Range):  Temp:  [98.5 °F (36.9 °C)-99.2 °F (37.3 °C)] 99.2 °F (37.3 °C)  Pulse:  [60-77] 77  Resp:  [16-18] 18  SpO2:  [97 %-98 %] 98 %  BP: ()/(52-90) 125/77     Weight: 77.7 kg (171 lb 4.8 oz)  Body mass index is 24.58 kg/m².     Physical Exam  Constitutional:       Appearance: She is normal weight. She is ill-appearing.   HENT:      Head: Normocephalic.      Right Ear: Tympanic membrane normal.      Left Ear: Tympanic membrane normal.      Nose: Nose normal.      Mouth/Throat:      Mouth: Mucous membranes are moist.      Pharynx: Oropharynx is clear.   Eyes:      General: Scleral icterus present.   Cardiovascular:      Rate and Rhythm: Normal rate and regular rhythm.      Pulses: Normal pulses.      Heart sounds: Normal heart sounds.   Pulmonary:      Effort: Pulmonary effort is normal.      Breath sounds: Normal breath sounds.   Abdominal:      Tenderness: There is abdominal tenderness.   Musculoskeletal:         General: Normal range of motion.   Skin:     General: Skin is warm and dry.      Capillary Refill: Capillary refill takes less than 2 seconds.      Coloration: Skin is jaundiced.   Neurological:      Mental Status: She is alert and oriented to person, place, and time.   Psychiatric:         Behavior: Behavior normal.              Significant Labs: All pertinent labs within the past 24 hours have  been reviewed.  BMP:   Recent Labs   Lab 07/05/23  1136   *   *   K 3.7   CL 96*   CO2 26   BUN 8   CREATININE 0.79   CALCIUM 8.1*     CBC:   Recent Labs   Lab 07/05/23  1136   WBC 5.46   HGB 12.4   HCT 33.7*          Significant Imaging: I have reviewed all pertinent imaging results/findings within the past 24 hours.

## 2023-07-06 NOTE — ED PROVIDER NOTES
Encounter Date: 2023       History     Chief Complaint   Patient presents with    Jaundice     HPI  Review of patient's allergies indicates:   Allergen Reactions    Phenergan [promethazine] Other (See Comments)     VIOLENT     Floxin [ofloxacin]     Penicillins     Reglan [metoclopramide]      Past Medical History:   Diagnosis Date    Diabetes mellitus     DVT (deep venous thrombosis)     Other pulmonary embolism without acute cor pulmonale      Past Surgical History:   Procedure Laterality Date    BLADDER SURGERY       SECTION      HYSTERECTOMY       Family History   Problem Relation Age of Onset    Cancer Mother     Diabetes Mother     Hypertension Mother     Cancer Father     Hypertension Father     Diabetes Father      Social History     Tobacco Use    Smoking status: Never    Smokeless tobacco: Current     Types: Snuff   Substance Use Topics    Alcohol use: Yes     Alcohol/week: 1.0 standard drink     Types: 1 Cans of beer per week     Comment: occasional    Drug use: Not Currently     Types: Methamphetamines     Review of Systems    Physical Exam     Initial Vitals [23 1100]   BP Pulse Resp Temp SpO2   104/73 71 18 98.5 °F (36.9 °C) 98 %      MAP       --         Physical Exam    Medical Screening Exam   See Full Note    ED Course   Procedures  Labs Reviewed   COMPREHENSIVE METABOLIC PANEL - Abnormal; Notable for the following components:       Result Value    Sodium 130 (*)     Chloride 96 (*)     Glucose 328 (*)     Calcium 8.1 (*)     Total Protein 6.3 (*)     Albumin 2.0 (*)     Globulin 4.3 (*)     Bilirubin, Total 17.5 (*)     Alk Phos 224 (*)     ALT 1,645 (*)     AST 2,355 (*)     All other components within normal limits   LIPASE - Abnormal; Notable for the following components:    Lipase 903 (*)     All other components within normal limits   URINALYSIS, REFLEX TO URINE CULTURE - Abnormal; Notable for the following components:    Color, UA Brown (*)     Leukocytes, UA Trace (*)      Nitrites, UA Positive (*)     Glucose, UA >=1000 (*)     Bilirubin, UA Large (*)     All other components within normal limits   CBC WITH DIFFERENTIAL - Abnormal; Notable for the following components:    RBC 4.17 (*)     Hematocrit 33.7 (*)     MCHC 36.8 (*)     RDW 19.4 (*)     Lymphocytes % 18.9 (*)     Monocytes % 17.2 (*)     Eosinophils % 0.7 (*)     Monocytes, Absolute 0.94 (*)     All other components within normal limits   MANUAL DIFFERENTIAL - Abnormal; Notable for the following components:    Segmented Neutrophils, Man % 70 (*)     Lymphocytes, Man % 17 (*)     Monocytes, Man % 9 (*)     All other components within normal limits   ACETAMINOPHEN LEVEL - Abnormal; Notable for the following components:    Acetaminophen <2 (*)     All other components within normal limits   URINALYSIS, MICROSCOPIC - Abnormal; Notable for the following components:    WBC, UA 20-50 (*)     Bacteria, UA Loaded (*)     Squamous Epithelial Cells, UA Few (*)     WBC Clumps Few (*)     All other components within normal limits   PROTIME-INR - Abnormal; Notable for the following components:    PT 17.8 (*)     All other components within normal limits   POCT GLUCOSE MONITORING CONTINUOUS - Abnormal; Notable for the following components:    POC Glucose 246 (*)     All other components within normal limits   LACTIC ACID, PLASMA - Normal   DRUG SCREEN, URINE (BEAKER) - Normal   CULTURE, URINE   CBC W/ AUTO DIFFERENTIAL    Narrative:     The following orders were created for panel order CBC auto differential.  Procedure                               Abnormality         Status                     ---------                               -----------         ------                     CBC with Differential[[id number]]        Abnormal            Final result               Manual Differential[[id number]]          Abnormal            Final result                 Please view results for these tests on the individual orders.   POCT GLUCOSE MONITORING  CONTINUOUS          Imaging Results    None          Medications   acetylcysteine 20% (200 mg/ml) (ACETADOTE) 3,200 mg in dextrose 5 % (D5W) 500 mL infusion (3,200 mg Intravenous New Bag 7/5/23 1617)   sodium chloride 0.9% bolus 1,000 mL 1,000 mL (0 mLs Intravenous Stopped 7/5/23 1247)   ondansetron injection 4 mg (4 mg Intravenous Given 7/5/23 1148)   cefTRIAXone (ROCEPHIN) 1 g in dextrose 5 % in water (D5W) 5 % 100 mL IVPB (MB+) (0 g Intravenous Stopped 7/5/23 1458)   acetylcysteine 20% (200 mg/ml) (ACETADOTE) 9,700 mg in dextrose 5 % (D5W) 250 mL infusion (0 mg Intravenous Stopped 7/5/23 1549)                             Clinical Impression:   Final diagnoses:  [R17] Jaundice  [R79.89] Elevated LFTs  [N39.0] Urinary tract infection without hematuria, site unspecified  [K72.00] Acute liver failure without hepatic coma (Primary)        ED Disposition Condition    Transfer to Another Facility Stable                MATILDE Sung  07/05/23 8845

## 2023-07-06 NOTE — PROGRESS NOTES
[facility] - [address] [place] [address] Medicine  Progress Note    Patient Name: Clarice Dupree  MRN: [id number]  Patient Class: IP- Inpatient   Admission Date: 7/5/2023  Length of Stay: 1 days  Attending Physician: Hunter Villaseñor DO  Primary Care Provider: Primary Doctor Asuncion        Subjective:     Principal Problem:Acute hepatitis        HPI:  This is a 48 y.o female who presents to [facility] ED today with c/o abdominal pain, nausea with vomiting, diarrhea and jaundice. Patient states she was recently discharged from [facility] on 6/11 and was supposed to follow up with her PCP and get an RUQ US but she forgot. Patient states that yesterday her nausea and vomiting increased and she needed to be seen today. She went in for her RUQ ultrasound and they sent her to the ED because she has diffuse Jaundice over her body. Patient was transferred to [facility] for higher level of care. Patient denies fever, shortness of breath or chest pain.    Patient has a history of T2DM, DVT, PE and Pancreatitis. Patient was discharged recently after being treated for pancreatitis. Patient states she has not used meth in 6 years. She had one beer about a month ago. She has taken tylenol over the past 2 weeks. She lives at home with her . She does endorse being more fatigued over the last month. She can complete all her ADLs with no assistance.    In the ED, initial presenting vitals were T. 99.7, P 77, R 18, /77 O2 sat 98 percent. Imaging completed includes US abdomen impression show a small biliary sludge noted. Mild-to-moderate gallbladder wall thickening without significant abnormal gallbladder distension. Pertinent labs were H/H 12.4/33/7, Na 130, glucose 328, PT 17.8  INR 1.48. Alk phos 224, AST 2,355 ALT 1645 and lipase 903. Acetaminophen level was less than 2 and UA positive for UTI.  Medications given in the ED include Zofran 4 mg, 1L NS bolus, Rocephin IVPB, and  N-acetylcysteine(Acetodote) IVPB.    Patient was admitted to [facility] medicine for the continued care and medical management of this patient.      Overview/Hospital Course:  7/6-actively nauseated this AM.  Elevated transaminase levels and bili less than one month ago but markedly worse today.  Hepatitis work up pending. Imaging without portal vein thrombosis, some GB sludge but not biliary tree distention.  Hepatocellular pattern. GI consulted      Interval History: naeo    Review of Systems   Constitutional:  Positive for fatigue.   HENT:  Negative for congestion, hearing loss, mouth sores and nosebleeds.    Eyes:  Negative for discharge and visual disturbance.   Respiratory:  Negative for cough and shortness of breath.    Cardiovascular:  Negative for chest pain and leg swelling.   Gastrointestinal:  Positive for abdominal pain, diarrhea, nausea and vomiting.   Endocrine: Negative for cold intolerance, heat intolerance and polydipsia.   Genitourinary:  Negative for difficulty urinating and dysuria.   Musculoskeletal:  Negative for arthralgias.   Allergic/Immunologic: Negative.    Neurological:  Positive for weakness and headaches.   Hematological: Negative.    Psychiatric/Behavioral: Negative.     Objective:     Vital Signs (Most Recent):  Temp: 98.7 °F (37.1 °C) (07/06/23 0730)  Pulse: 80 (07/06/23 0730)  Resp: 10 (07/06/23 0730)  BP: 117/81 (07/06/23 0730)  SpO2: 96 % (07/06/23 0730) Vital Signs (24h Range):  Temp:  [97.7 °F (36.5 °C)-99.2 °F (37.3 °C)] 98.7 °F (37.1 °C)  Pulse:  [60-85] 80  Resp:  [10-18] 10  SpO2:  [96 %-100 %] 96 %  BP: ()/(52-90) 117/81     Weight: 77.7 kg (171 lb 4.8 oz)  Body mass index is 24.58 kg/m².  No intake or output data in the 24 hours ending 07/06/23 0945      Physical Exam  Vitals reviewed.   Constitutional:       Appearance: Normal appearance. She is ill-appearing.   HENT:      Head: Normocephalic and atraumatic.   Eyes:      General: Scleral icterus present.       Pupils: Pupils are equal, round, and reactive to light.   Cardiovascular:      Rate and Rhythm: Regular rhythm. Tachycardia present.      Pulses: Normal pulses.   Pulmonary:      Effort: Pulmonary effort is normal.      Breath sounds: Normal breath sounds.   Abdominal:      General: Abdomen is flat.      Palpations: Abdomen is soft.   Musculoskeletal:         General: Normal range of motion.   Skin:     General: Skin is warm and dry.      Capillary Refill: Capillary refill takes less than 2 seconds.      Coloration: Skin is jaundiced.   Neurological:      General: No focal deficit present.      Mental Status: She is alert and oriented to person, place, and time.   Psychiatric:         Mood and Affect: Mood normal.         Behavior: Behavior normal.           Significant Labs: All pertinent labs within the past 24 hours have been reviewed.    Significant Imaging: I have reviewed all pertinent imaging results/findings within the past 24 hours.      Assessment/Plan:      * Acute hepatitis  Hepatocellular pattern  Imaging unremarkable aside from sludge in GB  UDS, tyelnol etoh negative  Further work up pending  GI consulted      Acute cystitis without hematuria  Postive UA  Urine Culture pending  Ceftriaxone 1G IVPB ordered      History of pulmonary embolism  Unclear if provoked or not at this time. Not on oac at home      Liver failure  Encephalopathic with elevated INR  Will discuss transfer with GI  Work up pending        Type 2 diabetes mellitus, with long-term current use of insulin  Patient's FSGs are uncontrolled due to hyperglycemia on current medication regimen.  Last A1c reviewed-   Lab Results   Component Value Date    HGBA1C 11.0 (H) 06/09/2023     Most recent fingerstick glucose reviewed- No results for input(s): POCTGLUCOSE in the last 24 hours.  Current correctional scale  Medium  Maintain anti-hyperglycemic dose as follows-   Antihyperglycemics (From admission, onward)    Start     Stop Route  Frequency Ordered    07/06/23 2100  insulin detemir U-100 injection 10 Units         -- SubQ Nightly 07/05/23 2156 07/05/23 2246  insulin aspart U-100 injection 1-10 Units         -- SubQ Before meals & nightly PRN 07/05/23 2156        Hold Oral hypoglycemics while patient is in the hospital.      VTE Risk Mitigation (From admission, onward)         Ordered     IP VTE LOW RISK PATIENT  Once         07/05/23 2156     Place sequential compression device  Until discontinued         07/05/23 2156                Discharge Planning   OLENA:      Code Status: DNR   Is the patient medically ready for discharge?:     Reason for patient still in hospital (select all that apply): Treatment                     Hunter Villaseñor DO  Department of Hospital Medicine   [facility] - [facility] Telemetry

## 2023-07-06 NOTE — HOSPITAL COURSE
7/6-actively nauseated this AM.  Elevated transaminase levels and bili less than one month ago but markedly worse today.  Hepatitis work up pending. Imaging without portal vein thrombosis, some GB sludge but not biliary tree distention.  Hepatocellular pattern. GI consulted  7/7-appears to be acute hepatitis B infection    7/7-Left AMA

## 2023-07-06 NOTE — PLAN OF CARE
SW attempted admission assessment. Pt did not feel well and declined to answer any questions at this time. SW will follow up later this afternoon.

## 2023-07-06 NOTE — PLAN OF CARE
Problem: Adult Inpatient Plan of Care  Goal: Plan of Care Review  Outcome: Ongoing, Progressing  Flowsheets (Taken 7/5/2023 2059)  Plan of Care Reviewed With: patient  Goal: Optimal Comfort and Wellbeing  Outcome: Ongoing, Progressing  Intervention: Monitor Pain and Promote Comfort  Flowsheets (Taken 7/5/2023 2059)  Pain Management Interventions:   pain management plan reviewed with patient/caregiver   pillow support provided   position adjusted   relaxation techniques promoted   quiet environment facilitated  Intervention: Provide Person-Centered Care  Flowsheets (Taken 7/5/2023 2059)  Trust Relationship/Rapport:   care explained   choices provided   emotional support provided   empathic listening provided   questions answered   questions encouraged   reassurance provided   thoughts/feelings acknowledged     Problem: Diabetes Comorbidity  Goal: Blood Glucose Level Within Targeted Range  Outcome: Ongoing, Progressing  Intervention: Monitor and Manage Glycemia  Flowsheets (Taken 7/5/2023 2059)  Glycemic Management:   blood glucose monitored   supplemental insulin given

## 2023-07-06 NOTE — ED NOTES
Called [facility] dispatch for transport to [facility]. States they will get an ambulance in route.

## 2023-07-06 NOTE — ASSESSMENT & PLAN NOTE
Michael's Criteria 2 points   Abdominal tenderness with nausea and vomiting noted  Lipase 903  Recently discharge with Pancreatitis  /cc  NPO after midnight  GI consulted

## 2023-07-06 NOTE — NURSING
Patient currently does have an IV patient was unable to get with ultrasound machine assistance. Patient stated she will try again on dayshift. Patient also refused labs until dayshift nurse verbalized understanding patient to continue with established plan of care. Physician is aware.

## 2023-07-06 NOTE — ASSESSMENT & PLAN NOTE
Patient's FSGs are uncontrolled due to hyperglycemia on current medication regimen.  Last A1c reviewed-   Lab Results   Component Value Date    HGBA1C 11.0 (H) 06/09/2023     Most recent fingerstick glucose reviewed- No results for input(s): POCTGLUCOSE in the last 24 hours.  Current correctional scale  Medium  Maintain anti-hyperglycemic dose as follows-   Antihyperglycemics (From admission, onward)    Start     Stop Route Frequency Ordered    07/06/23 2100  insulin detemir U-100 injection 10 Units         -- SubQ Nightly 07/05/23 2156    07/05/23 2246  insulin aspart U-100 injection 1-10 Units         -- SubQ Before meals & nightly PRN 07/05/23 2156        Hold Oral hypoglycemics while patient is in the hospital.

## 2023-07-06 NOTE — HPI
This is a 48 y.o female who presents to [facility] ED today with c/o abdominal pain, nausea with vomiting, diarrhea and jaundice. Patient states she was recently discharged from [facility] on 6/11 and was supposed to follow up with her PCP and get an RUQ US but she forgot. Patient states that yesterday her nausea and vomiting increased and she needed to be seen today. She went in for her RUQ ultrasound and they sent her to the ED because she has diffuse Jaundice over her body. Patient was transferred to [facility] for higher level of care. Patient denies fever, shortness of breath or chest pain.    Patient has a history of T2DM, DVT, PE and Pancreatitis. Patient was discharged recently after being treated for pancreatitis. Patient states she has not used meth in 6 years. She had one beer about a month ago. She has taken tylenol over the past 2 weeks. She lives at home with her . She does endorse being more fatigued over the last month. She can complete all her ADLs with no assistance.    In the ED, initial presenting vitals were T. 99.7, P 77, R 18, /77 O2 sat 98 percent. Imaging completed includes US abdomen impression show a small biliary sludge noted. Mild-to-moderate gallbladder wall thickening without significant abnormal gallbladder distension. Pertinent labs were H/H 12.4/33/7, Na 130, glucose 328, PT 17.8  INR 1.48. Alk phos 224, AST 2,355 ALT 1645 and lipase 903. Acetaminophen level was less than 2 and UA positive for UTI.  Medications given in the ED include Zofran 4 mg, 1L NS bolus, Rocephin IVPB, and N-acetylcysteine(Acetodote) IVPB.    Patient was admitted to [facility] medicine for the continued care and medical management of this patient.

## 2023-07-06 NOTE — ASSESSMENT & PLAN NOTE
Patient recently discharged for pancreatitis 6/11  Alk phos- 224,AST 2,355, ALT 1,645, Lipase 903. Acetaminophen level < 2.   US abdomen- Small biliary sludge noted. Mild-to-moderate gallbladder wall thickening without significant abnormal gallbladder distension  Hepatic US with doppler pending- to R/O portal vein thrombosis  Pending labs: Ammonia, AMA, Anti-smooth muscle antibody, Ceruloplasmin, Ferritin, Gamma GT, Hepatic Function panel, Hepatitis panel, HIV, IgA, IgG, IgM, PT/INR, Iron and TIBC  GI consulted thank them for their assistance

## 2023-07-06 NOTE — ED NOTES
1759- called dispatch for update on eta of ALS truck for transport. Stated no als truck available.     1855- called dispatch for update. No als truck in [facility] per dispatcher. Informed dispatch that patient is wanting to sign out ama    1900 - Patient signed out ama, stated she would go via pov. Has been waiting on ambulance extended period of time. Risks explained to patient. [place] form signed. Medication stopped and IV removed.     1903- Dispatch called and now has a ALS truck available. Informed patient has signed out and left ama.     1905- Called [facility] to inform of what occurred and that patient stated she would present there pov. Calling supervisor to check what to do.     1915- Secure chat received. Will put patient as transfer via pov. Patient has bed 561 at [facility] already assigned to her.     1916-Carleen on 5th nurses station called and updated that patient is coming POV and without an IV.

## 2023-07-06 NOTE — ASSESSMENT & PLAN NOTE
Hepatocellular pattern  Imaging unremarkable aside from sludge in GB  UDS, tyelnol etoh negative  Further work up pending  GI consulted

## 2023-07-06 NOTE — ASSESSMENT & PLAN NOTE
Patient states she has not taken her Xarelto in two years  INR 1.48. Will hold anticoagulation for now.

## 2023-07-06 NOTE — CONSULTS
Gastroenterology Consult Note    Chief Complaint: Acute hepatitis    Consulted by:   Dr. Lyons     HPI:  Clarice Dupree is a 48 y.o. WF with DM-II, h/o VTE (not currently on AC), recent acute pancreatitis requiring brief hospital admission that presents from [facility] with N/V, abdominal pain, and jaundice. Patient with recent presentation and admission for AIP with improving status with fluids and pain control. After discharge, she went for abdominal US and was overtly jaundiced and referred to the ER at [place]. She complains of several months of abdominal pain and persistent N/V, but this has culminated in the last several days as being far worse than prior. She denies alcohol abuse, IVDU, opioid/narcotic use, herbal supplements, recent new meds or antibiotics. 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 Denies home needle use for tattoo. No blood transfusions or foreign travel. She previously worked and was exposed to some kind of herbicide in the last few years which she claims prompted her illness. Father had cirrhosis from heavy alcohol use - longtime alcoholic per patient. No prior endoscopy. No additional family members with cirrhosis. No h/o IBD.     Review of Systems   Constitutional:  Positive for malaise/fatigue and weight loss.   Gastrointestinal:  Positive for abdominal pain, nausea and vomiting. Negative for blood in stool, constipation, diarrhea, heartburn and melena.   All other systems reviewed and are negative.    Past Medical History:   Diagnosis Date    Diabetes mellitus     DVT (deep venous thrombosis)     Nausea and vomiting 2023    Other pulmonary embolism without acute cor pulmonale      Past Surgical History:   Procedure Laterality Date    BLADDER SURGERY       SECTION      HYSTERECTOMY       Family History   Problem Relation Age of Onset    Cancer Mother     Diabetes Mother     Hypertension Mother     Cancer Father     Hypertension Father   "   Diabetes Father        OBJECTIVE:  /70   Pulse 69   Temp 97.4 °F (36.3 °C)   Resp 18   Ht 5' 10" (1.778 m)   Wt 77.7 kg (171 lb 4.8 oz)   SpO2 96%   Breastfeeding No   BMI 24.58 kg/m²   GEN: ill appearing, cooperative, NAD  HEENT: NCAT, poor dentition, MMM  NECK: Supple, no LAD  CV: normal rate, regular rhythm  RESP: CTA bilaterally, unlabored  ABD: NABS, ND, mild RUQ TTP, soft, no guarding  EXT: No clubbing, cyanosis, or edema.  SKIN: Warm and dry; jaundiced  NEURO: AAO x4. Afocal. No asterixis.     LABS:  CMP  Sodium   Date Value Ref Range Status   07/06/2023 130 (L) 136 - 145 mmol/L Final     Potassium   Date Value Ref Range Status   07/06/2023 3.9 3.5 - 5.1 mmol/L Final     Chloride   Date Value Ref Range Status   07/06/2023 98 98 - 107 mmol/L Final     CO2   Date Value Ref Range Status   07/06/2023 28 21 - 32 mmol/L Final     Glucose   Date Value Ref Range Status   07/06/2023 315 (H) 74 - 106 mg/dL Final     BUN   Date Value Ref Range Status   07/06/2023 6 (L) 7 - 18 mg/dL Final     Creatinine   Date Value Ref Range Status   07/06/2023 0.64 0.55 - 1.02 mg/dL Final     Calcium   Date Value Ref Range Status   07/06/2023 7.7 (L) 8.5 - 10.1 mg/dL Final     Total Protein   Date Value Ref Range Status   07/06/2023 5.9 (L) 6.4 - 8.2 g/dL Final     Albumin   Date Value Ref Range Status   07/06/2023 1.9 (L) 3.5 - 5.0 g/dL Final     Bilirubin, Total   Date Value Ref Range Status   07/06/2023 18.2 (H) >0.0 - 1.2 mg/dL Final     Alk Phos   Date Value Ref Range Status   07/06/2023 226 (H) 39 - 100 U/L Final     AST   Date Value Ref Range Status   07/06/2023 2,199 (H) 15 - 37 U/L Final     ALT   Date Value Ref Range Status   07/06/2023 1,615 (H) 13 - 56 U/L Final     Anion Gap   Date Value Ref Range Status   07/06/2023 8 7 - 16 mmol/L Final     eGFR   Date Value Ref Range Status   07/06/2023 110 >=60 mL/min/1.73m2 Final     Lab Results   Component Value Date    WBC 6.18 07/06/2023    HGB 12.4 07/06/2023    " HCT 34.6 (L) 07/06/2023    MCV 81.8 07/06/2023     07/06/2023       Lab Results   Component Value Date    INR 1.29 07/06/2023    INR 1.48 07/05/2023     Lab Results   Component Value Date    HEPAIGM Non-Reactive 07/06/2023    HEPBIGM Reactive (A) 07/06/2023    HEPCAB Non-Reactive 07/06/2023       IMAGING:    Abd US  - Small biliary sludge noted. Mild-to-moderate gallbladder wall thickening without significant abnormal gallbladder distension.   - Diffuse hepatomegaly  - Hepatic and portal veins are patent with proper directional flow  - CBD 3-mm with no intrahepatic biliary dilatation     ASSESSMENT:    48 y.o. WF with DM-II, h/o VTE (not currently on AC), recent acute pancreatitis requiring brief hospital admission that presents from [facility] with N/V, abdominal pain, and jaundice.     PLAN:    Acute Liver Injury   - hepatocellular pattern; preliminary workup consistent with Acute Hepatitis B viral infection  - no biliary dilatation to suggest obstruction; afebrile, no leukocytosis   - tylenol, alcohol levels normal; denies overdose/abuse and illicits; no new or recent medications; only daily medication is tresiba  - she does not have acute liver failure    - transaminases appear to have hit their plateau; Bili rate of rise slowing and will be the last to plateau before improving  - in general, acute hepatitis B infection is self limiting and improves with supportive therapy; will continue to monitor, but improving transaminases & INR with lack of encephalopathy is reassuring  - HIV negative; checking HDV  - patient denies needles, sexual intercourse, blood exposure    - Etiology: appears to be Acute Hepatitis B - confirmation pending  - Chronic liver workup: pending - ASMA, PEth, IgG/M/A   - MARYJANE+   - negative: AMA, dsDNA, ceruloplasmin, iron panel  - Vaccinations: check HAV status (pending); recommend vaccination if not immune    - MELD: 25 (driven by bili)  - Encephalopathy: no encephalopathy   - Transplant  candidate: uninsured; patient is also a DNR and says that she would not want a transplant (if it were indicated)      Thank you for the consult and including me in the care of this patient. Please call me with questions.     John Villaseñor MD  Gastroenterology

## 2023-07-06 NOTE — SUBJECTIVE & OBJECTIVE
Interval History: naeo    Review of Systems   Constitutional:  Positive for fatigue.   HENT:  Negative for congestion, hearing loss, mouth sores and nosebleeds.    Eyes:  Negative for discharge and visual disturbance.   Respiratory:  Negative for cough and shortness of breath.    Cardiovascular:  Negative for chest pain and leg swelling.   Gastrointestinal:  Positive for abdominal pain, diarrhea, nausea and vomiting.   Endocrine: Negative for cold intolerance, heat intolerance and polydipsia.   Genitourinary:  Negative for difficulty urinating and dysuria.   Musculoskeletal:  Negative for arthralgias.   Allergic/Immunologic: Negative.    Neurological:  Positive for weakness and headaches.   Hematological: Negative.    Psychiatric/Behavioral: Negative.     Objective:     Vital Signs (Most Recent):  Temp: 98.7 °F (37.1 °C) (07/06/23 0730)  Pulse: 80 (07/06/23 0730)  Resp: 10 (07/06/23 0730)  BP: 117/81 (07/06/23 0730)  SpO2: 96 % (07/06/23 0730) Vital Signs (24h Range):  Temp:  [97.7 °F (36.5 °C)-99.2 °F (37.3 °C)] 98.7 °F (37.1 °C)  Pulse:  [60-85] 80  Resp:  [10-18] 10  SpO2:  [96 %-100 %] 96 %  BP: ()/(52-90) 117/81     Weight: 77.7 kg (171 lb 4.8 oz)  Body mass index is 24.58 kg/m².  No intake or output data in the 24 hours ending 07/06/23 0945      Physical Exam  Vitals reviewed.   Constitutional:       Appearance: Normal appearance. She is ill-appearing.   HENT:      Head: Normocephalic and atraumatic.   Eyes:      General: Scleral icterus present.      Pupils: Pupils are equal, round, and reactive to light.   Cardiovascular:      Rate and Rhythm: Regular rhythm. Tachycardia present.      Pulses: Normal pulses.   Pulmonary:      Effort: Pulmonary effort is normal.      Breath sounds: Normal breath sounds.   Abdominal:      General: Abdomen is flat.      Palpations: Abdomen is soft.   Musculoskeletal:         General: Normal range of motion.   Skin:     General: Skin is warm and dry.      Capillary Refill:  Capillary refill takes less than 2 seconds.      Coloration: Skin is jaundiced.   Neurological:      General: No focal deficit present.      Mental Status: She is alert and oriented to person, place, and time.   Psychiatric:         Mood and Affect: Mood normal.         Behavior: Behavior normal.           Significant Labs: All pertinent labs within the past 24 hours have been reviewed.    Significant Imaging: I have reviewed all pertinent imaging results/findings within the past 24 hours.

## 2023-07-07 VITALS
BODY MASS INDEX: 24.52 KG/M2 | HEART RATE: 73 BPM | HEIGHT: 70 IN | TEMPERATURE: 98 F | SYSTOLIC BLOOD PRESSURE: 128 MMHG | DIASTOLIC BLOOD PRESSURE: 72 MMHG | WEIGHT: 171.31 LBS | OXYGEN SATURATION: 98 % | RESPIRATION RATE: 18 BRPM

## 2023-07-07 PROBLEM — K76.82 HEPATIC ENCEPHALOPATHY: Status: RESOLVED | Noted: 2023-07-06 | Resolved: 2023-07-07

## 2023-07-07 PROBLEM — K72.90 LIVER FAILURE: Status: RESOLVED | Noted: 2023-07-05 | Resolved: 2023-07-07

## 2023-07-07 LAB
A1AT SERPL NEPH-MCNC: 153 MG/DL (ref 90–200)
ALBUMIN SERPL BCP-MCNC: 1.7 G/DL (ref 3.5–5)
ALBUMIN/GLOB SERPL: 0.4 {RATIO}
ALP SERPL-CCNC: 226 U/L (ref 39–100)
ALT SERPL W P-5'-P-CCNC: 1425 U/L (ref 13–56)
ANION GAP SERPL CALCULATED.3IONS-SCNC: 8 MMOL/L (ref 7–16)
AST SERPL W P-5'-P-CCNC: 1860 U/L (ref 15–37)
BASOPHILS # BLD AUTO: 0.07 K/UL (ref 0–0.2)
BASOPHILS NFR BLD AUTO: 1.2 % (ref 0–1)
BILIRUB DIRECT SERPL-MCNC: 13.1 MG/DL (ref 0–0.2)
BILIRUB SERPL-MCNC: 16.9 MG/DL (ref ?–1.2)
BUN SERPL-MCNC: 5 MG/DL (ref 7–18)
BUN/CREAT SERPL: 8 (ref 6–20)
CALCIUM SERPL-MCNC: 7.7 MG/DL (ref 8.5–10.1)
CHLORIDE SERPL-SCNC: 99 MMOL/L (ref 98–107)
CO2 SERPL-SCNC: 29 MMOL/L (ref 21–32)
CREAT SERPL-MCNC: 0.64 MG/DL (ref 0.55–1.02)
DIFFERENTIAL METHOD BLD: ABNORMAL
EGFR (NO RACE VARIABLE) (RUSH/TITUS): 110 ML/MIN/1.73M2
EOSINOPHIL # BLD AUTO: 0.11 K/UL (ref 0–0.5)
EOSINOPHIL NFR BLD AUTO: 1.9 % (ref 1–4)
ERYTHROCYTE [DISTWIDTH] IN BLOOD BY AUTOMATED COUNT: 20 % (ref 11.5–14.5)
GLOBULIN SER-MCNC: 4.1 G/DL (ref 2–4)
GLUCOSE SERPL-MCNC: 278 MG/DL (ref 74–106)
HAV AB SER QL IA: NORMAL
HBV SURFACE AB SER-ACNC: NORMAL M[IU]/ML
HCT VFR BLD AUTO: 35.4 % (ref 38–47)
HGB BLD-MCNC: 12.4 G/DL (ref 12–16)
IGA SERPL-MCNC: 167 MG/DL (ref 61–356)
IGG SERPL-MCNC: 2000 MG/DL (ref 767–1590)
IGM SERPL-MCNC: 248 MG/DL (ref 37–286)
IMM GRANULOCYTES # BLD AUTO: 0.05 K/UL (ref 0–0.04)
IMM GRANULOCYTES NFR BLD: 0.8 % (ref 0–0.4)
INR BLD: 1.32
LYMPHOCYTES # BLD AUTO: 1.16 K/UL (ref 1–4.8)
LYMPHOCYTES NFR BLD AUTO: 19.7 % (ref 27–41)
MCH RBC QN AUTO: 29.3 PG (ref 27–31)
MCHC RBC AUTO-ENTMCNC: 35 G/DL (ref 32–36)
MCV RBC AUTO: 83.7 FL (ref 80–96)
MONOCYTES # BLD AUTO: 0.75 K/UL (ref 0–0.8)
MONOCYTES NFR BLD AUTO: 12.7 % (ref 2–6)
MPC BLD CALC-MCNC: 12.6 FL (ref 9.4–12.4)
NEUTROPHILS # BLD AUTO: 3.75 K/UL (ref 1.8–7.7)
NEUTROPHILS NFR BLD AUTO: 63.7 % (ref 53–65)
NRBC # BLD AUTO: 0 X10E3/UL
NRBC, AUTO (.00): 0 %
PLATELET # BLD AUTO: 196 K/UL (ref 150–400)
POTASSIUM SERPL-SCNC: 3.5 MMOL/L (ref 3.5–5.1)
PROT SERPL-MCNC: 5.8 G/DL (ref 6.4–8.2)
PROTHROMBIN TIME: 16.2 SECONDS (ref 11.7–14.7)
RBC # BLD AUTO: 4.23 M/UL (ref 4.2–5.4)
SODIUM SERPL-SCNC: 132 MMOL/L (ref 136–145)
UA COMPLETE W REFLEX CULTURE PNL UR: ABNORMAL
WBC # BLD AUTO: 5.89 K/UL (ref 4.5–11)

## 2023-07-07 PROCEDURE — 99238 HOSP IP/OBS DSCHRG MGMT 30/<: CPT | Mod: ,,, | Performed by: STUDENT IN AN ORGANIZED HEALTH CARE EDUCATION/TRAINING PROGRAM

## 2023-07-07 PROCEDURE — 63600175 PHARM REV CODE 636 W HCPCS

## 2023-07-07 PROCEDURE — 80053 COMPREHEN METABOLIC PANEL: CPT | Performed by: STUDENT IN AN ORGANIZED HEALTH CARE EDUCATION/TRAINING PROGRAM

## 2023-07-07 PROCEDURE — 82248 BILIRUBIN DIRECT: CPT | Performed by: INTERNAL MEDICINE

## 2023-07-07 PROCEDURE — 99238 PR HOSPITAL DISCHARGE DAY,<30 MIN: ICD-10-PCS | Mod: ,,, | Performed by: STUDENT IN AN ORGANIZED HEALTH CARE EDUCATION/TRAINING PROGRAM

## 2023-07-07 PROCEDURE — 86706 HEP B SURFACE ANTIBODY: CPT | Performed by: INTERNAL MEDICINE

## 2023-07-07 PROCEDURE — 82784 ASSAY IGA/IGD/IGG/IGM EACH: CPT | Performed by: INTERNAL MEDICINE

## 2023-07-07 PROCEDURE — 86708 HEPATITIS A ANTIBODY: CPT | Performed by: INTERNAL MEDICINE

## 2023-07-07 PROCEDURE — 25000003 PHARM REV CODE 250

## 2023-07-07 PROCEDURE — 85025 COMPLETE CBC W/AUTO DIFF WBC: CPT | Performed by: STUDENT IN AN ORGANIZED HEALTH CARE EDUCATION/TRAINING PROGRAM

## 2023-07-07 PROCEDURE — 82103 ALPHA-1-ANTITRYPSIN TOTAL: CPT | Performed by: INTERNAL MEDICINE

## 2023-07-07 PROCEDURE — 85610 PROTHROMBIN TIME: CPT | Performed by: INTERNAL MEDICINE

## 2023-07-07 PROCEDURE — 86692 HEPATITIS DELTA AGENT ANTBDY: CPT | Mod: 90 | Performed by: INTERNAL MEDICINE

## 2023-07-07 RX ADMIN — SODIUM CHLORIDE: 9 INJECTION, SOLUTION INTRAVENOUS at 07:07

## 2023-07-07 RX ADMIN — PANTOPRAZOLE SODIUM 40 MG: 40 TABLET, DELAYED RELEASE ORAL at 09:07

## 2023-07-07 RX ADMIN — CEFTRIAXONE SODIUM 1 G: 1 INJECTION, POWDER, FOR SOLUTION INTRAMUSCULAR; INTRAVENOUS at 09:07

## 2023-07-07 RX ADMIN — INSULIN ASPART 2 UNITS: 100 INJECTION, SOLUTION INTRAVENOUS; SUBCUTANEOUS at 05:07

## 2023-07-07 NOTE — ASSESSMENT & PLAN NOTE
Improving, confusion yesterday morning completely resolved by the evening.  She is doing well now. Not meeting criteria for liver failure  Ruled out

## 2023-07-07 NOTE — PLAN OF CARE
[facility] - [facility] Telemetry  Discharge Final Note    Primary Care Provider: Primary Doctor No    Expected Discharge Date: 7/7/2023    Final Discharge Note (most recent)       Final Note - 07/07/23 1552          Final Note    Assessment Type Final Discharge Note     Anticipated Discharge Disposition Left Against Medical Advice     What phone number can be called within the next 1-3 days to see how you are doing after discharge? [phone]        Post-Acute Status    Discharge Delays None known at this time                       Pt left AMA.

## 2023-07-07 NOTE — DISCHARGE SUMMARY
[facility] - [address] [place] [address] Medicine  Discharge Summary      Patient Name: Clarice Dupree  MRN: [id number]  NATACHA: [id number]  Patient Class: IP- Inpatient  Admission Date: 7/5/2023  Hospital Length of Stay: 2 days  Discharge Date and Time:  07/07/2023 2:25 PM  Attending Physician: Hunter Villaseñor DO   Discharging Provider: Hunter Villaseñor DO  Primary Care Provider: Primary Doctor No    Primary Care Team: Networked reference to record PCT     HPI:   This is a 48 y.o female who presents to [facility] ED today with c/o abdominal pain, nausea with vomiting, diarrhea and jaundice. Patient states she was recently discharged from [facility] on 6/11 and was supposed to follow up with her PCP and get an RUQ US but she forgot. Patient states that yesterday her nausea and vomiting increased and she needed to be seen today. She went in for her RUQ ultrasound and they sent her to the ED because she has diffuse Jaundice over her body. Patient was transferred to [facility] for higher level of care. Patient denies fever, shortness of breath or chest pain.    Patient has a history of T2DM, DVT, PE and Pancreatitis. Patient was discharged recently after being treated for pancreatitis. Patient states she has not used meth in 6 years. She had one beer about a month ago. She has taken tylenol over the past 2 weeks. She lives at home with her . She does endorse being more fatigued over the last month. She can complete all her ADLs with no assistance.    In the ED, initial presenting vitals were T. 99.7, P 77, R 18, /77 O2 sat 98 percent. Imaging completed includes US abdomen impression show a small biliary sludge noted. Mild-to-moderate gallbladder wall thickening without significant abnormal gallbladder distension. Pertinent labs were H/H 12.4/33/7, Na 130, glucose 328, PT 17.8  INR 1.48. Alk phos 224, AST 2,355 ALT 1645 and lipase 903. Acetaminophen level was less than 2 and  UA positive for UTI.  Medications given in the ED include Zofran 4 mg, 1L NS bolus, Rocephin IVPB, and N-acetylcysteine(Acetodote) IVPB.    Patient was admitted to [facility] medicine for the continued care and medical management of this patient.      * No surgery found *      Hospital Course:   7/6-actively nauseated this AM.  Elevated transaminase levels and bili less than one month ago but markedly worse today.  Hepatitis work up pending. Imaging without portal vein thrombosis, some GB sludge but not biliary tree distention.  Hepatocellular pattern. GI consulted  7/7-appears to be acute hepatitis B infection    7/7-Left AMA       Goals of Care Treatment Preferences:  Code Status: DNR      Consults:   Consults (From admission, onward)        Status Ordering Provider     Inpatient consult to Gastroenterology  Once        Provider:  John Villaseñor MD    Completed ERIN PEREZ          Renal/  Acute cystitis without hematuria  Left AMA    Hematology  History of pulmonary embolism        GI  * Acute hepatitis  Left AMA      Liver failure-resolved as of 7/7/2023            Final Active Diagnoses:    Diagnosis Date Noted POA    PRINCIPAL PROBLEM:  Acute hepatitis [B17.9] 07/06/2023 Yes    History of pulmonary embolism [Z86.711] 07/05/2023 Yes    Acute cystitis without hematuria [N30.00] 07/05/2023 Yes    Type 2 diabetes mellitus, with long-term current use of insulin [E11.9, Z79.4] 06/09/2023 Not Applicable      Problems Resolved During this Admission:    Diagnosis Date Noted Date Resolved POA    Hepatic encephalopathy [K76.82] 07/06/2023 07/07/2023 No    Liver failure [K72.90] 07/05/2023 07/07/2023 Unknown    Nausea and vomiting [R11.2] 06/09/2023 07/05/2023 Yes    Acute pancreatitis without infection or necrosis [K85.90] 06/09/2023 07/06/2023 Yes       Discharged Condition: against medical advice    Disposition:     Follow Up:    Patient Instructions:   No discharge procedures on  file.    Significant Diagnostic Studies: N/A  LEFT AMA    Pending Diagnostic Studies:     Procedure Component Value Units Date/Time    Anti-Smooth Muscle Antibody [[id number]] Collected: 07/06/23 0653    Order Status: Sent Lab Status: In process Updated: 07/06/23 0722    Specimen: Blood     HEPATITIS B SURFACE AG W/RFLX TO CONFIRM [[id number]] Collected: 07/06/23 0653    Order Status: Sent Lab Status: In process Updated: 07/06/23 1454    Specimen: Blood     Hepatitis Delta Virus [[id number]] Collected: 07/07/23 0816    Order Status: Sent Lab Status: In process Updated: 07/07/23 0816    Specimen: Blood     [place] GENERIC ORDERABLE PETH - Overview: Phosphatidylethanol Confirmation, Blood [[id number]] Collected: 07/06/23 0653    Order Status: Sent Lab Status: In process Updated: 07/06/23 1013    Specimen: Blood          Medications:  Not reconciled, patient left AMA    Indwelling Lines/Drains at time of discharge:   Lines/Drains/Airways     None                 Time spent on the discharge of patient: <30 minutes         Hunter Villaseñor DO  Department of Hospital Medicine  [facility] - [facility] Tele[facility]

## 2023-07-07 NOTE — NURSING
Patient insisting on leaving AMA, made Dr Villaseñor aware, states if she continues to insist on leaving, give AMA paperwork. Patient received AMA paperwork after trying to explain to her the importance of her receiving care. Patient continues to insist to leave, informed her about leaving AMA, patient acknowledged understanding informed her to follow up with PCP and GI doctor.IV access removed and patient left floor via wheelchair with ,all belongings returned.

## 2023-07-07 NOTE — PLAN OF CARE
Problem: Adult Inpatient Plan of Care  Goal: Plan of Care Review  Outcome: Unable to Meet, Plan Revised  Goal: Patient-Specific Goal (Individualized)  Outcome: Unable to Meet, Plan Revised  Goal: Absence of Hospital-Acquired Illness or Injury  Outcome: Unable to Meet, Plan Revised  Goal: Optimal Comfort and Wellbeing  Outcome: Unable to Meet, Plan Revised  Goal: Readiness for Transition of Care  Outcome: Unable to Meet, Plan Revised     Problem: Diabetes Comorbidity  Goal: Blood Glucose Level Within Targeted Range  Outcome: Unable to Meet, Plan Revised     Problem: Impaired Wound Healing  Goal: Optimal Wound Healing  Outcome: Unable to Meet, Plan Revised

## 2023-07-07 NOTE — PROGRESS NOTES
GI Brief Plan of Care Note     - attempted to see patient today on rounds, but she was off the floor, left for smoke break  - INR 1.3, and transaminases trending down nicely  - preliminary labs consistent with Acute Hepatitis B infection, which explains her symptoms and presentation; unclear how she contracted this; household members & sexual partners should be advised and should get vaccinated and offered HBV IVIG (if available at local health department)    Plan     - no encephalopathy, INR stable, ALT/AST, Bili trending down  - if labs continue to downtrend tomorrow, would be OK for discharge  - recommend follow up with PCP 1 week after discharge for CMP and INR to ensure stable/improving  - recommend follow up in GI clinic with MICHEL Hanson 1 month after discharge with labs - CMP/INR, HBV DNA  - Dr. Goode will be covering consults over the weekend if there are any acute GI needs    John Villaseñor MD  Gastroenterology

## 2023-07-07 NOTE — PROGRESS NOTES
[facility] - [address] [place] [address] Medicine  Progress Note    Patient Name: Clarice Dupree  MRN: [id number]  Patient Class: IP- Inpatient   Admission Date: 7/5/2023  Length of Stay: 2 days  Attending Physician: Hunter Villaseñor DO  Primary Care Provider: Primary Doctor Asuncion        Subjective:     Principal Problem:Acute hepatitis        HPI:  This is a 48 y.o female who presents to [facility] ED today with c/o abdominal pain, nausea with vomiting, diarrhea and jaundice. Patient states she was recently discharged from [facility] on 6/11 and was supposed to follow up with her PCP and get an RUQ US but she forgot. Patient states that yesterday her nausea and vomiting increased and she needed to be seen today. She went in for her RUQ ultrasound and they sent her to the ED because she has diffuse Jaundice over her body. Patient was transferred [facility] for higher level of care. Patient denies fever, shortness of breath or chest pain.    Patient has a history of T2DM, DVT, PE and Pancreatitis. Patient was discharged recently after being treated for pancreatitis. Patient states she has not used meth in 6 years. She had one beer about a month ago. She has taken tylenol over the past 2 weeks. She lives at home with her . She does endorse being more fatigued over the last month. She can complete all her ADLs with no assistance.    In the ED, initial presenting vitals were T. 99.7, P 77, R 18, /77 O2 sat 98 percent. Imaging completed includes US abdomen impression show a small biliary sludge noted. Mild-to-moderate gallbladder wall thickening without significant abnormal gallbladder distension. Pertinent labs were H/H 12.4/33/7, Na 130, glucose 328, PT 17.8  INR 1.48. Alk phos 224, AST 2,355 ALT 1645 and lipase 903. Acetaminophen level was less than 2 and UA positive for UTI.  Medications given in the ED include Zofran 4 mg, 1L NS bolus, Rocephin IVPB, and  N-acetylcysteine(Acetodote) IVPB.    Patient was admitted to [facility] medicine for the continued care and medical management of this patient.      Overview/Hospital Course:  7/6-actively nauseated this AM.  Elevated transaminase levels and bili less than one month ago but markedly worse today.  Hepatitis work up pending. Imaging without portal vein thrombosis, some GB sludge but not biliary tree distention.  Hepatocellular pattern. GI consulted  7/7-appears to be acute hepatitis B infection      Interval History: naeo    Review of Systems   Constitutional:  Positive for fatigue.   HENT:  Negative for congestion, hearing loss, mouth sores and nosebleeds.    Eyes:  Negative for discharge and visual disturbance.   Respiratory:  Negative for cough and shortness of breath.    Cardiovascular:  Negative for chest pain and leg swelling.   Gastrointestinal:  Positive for abdominal pain, diarrhea and vomiting.   Endocrine: Negative for cold intolerance, heat intolerance and polydipsia.   Genitourinary:  Negative for difficulty urinating and dysuria.   Musculoskeletal:  Negative for arthralgias.   Allergic/Immunologic: Negative.    Neurological:  Positive for weakness and headaches.   Hematological: Negative.    Psychiatric/Behavioral: Negative.     Objective:     Vital Signs (Most Recent):  Temp: 98.3 °F (36.8 °C) (07/07/23 0705)  Pulse: 82 (07/07/23 0705)  Resp: 18 (07/07/23 0705)  BP: 126/81 (07/07/23 0705)  SpO2: 97 % (07/07/23 0705) Vital Signs (24h Range):  Temp:  [97.3 °F (36.3 °C)-98.3 °F (36.8 °C)] 98.3 °F (36.8 °C)  Pulse:  [69-82] 82  Resp:  [16-18] 18  SpO2:  [96 %-98 %] 97 %  BP: (105-126)/(64-83) 126/81     Weight: 77.7 kg (171 lb 4.8 oz)  Body mass index is 24.58 kg/m².  No intake or output data in the 24 hours ending 07/07/23 0817      Physical Exam  Vitals reviewed.   Constitutional:       Appearance: Normal appearance. She is ill-appearing.   HENT:      Head: Normocephalic and atraumatic.   Eyes:       General: Scleral icterus present.      Pupils: Pupils are equal, round, and reactive to light.   Cardiovascular:      Rate and Rhythm: Regular rhythm. Tachycardia present.      Pulses: Normal pulses.   Pulmonary:      Effort: Pulmonary effort is normal.      Breath sounds: Normal breath sounds.   Abdominal:      General: Abdomen is flat.      Palpations: Abdomen is soft.   Musculoskeletal:         General: Normal range of motion.   Skin:     General: Skin is warm and dry.      Capillary Refill: Capillary refill takes less than 2 seconds.      Coloration: Skin is jaundiced.   Neurological:      General: No focal deficit present.      Mental Status: She is alert and oriented to person, place, and time.   Psychiatric:         Mood and Affect: Mood normal.         Behavior: Behavior normal.           Significant Labs: All pertinent labs within the past 24 hours have been reviewed.    Significant Imaging: I have reviewed all pertinent imaging results/findings within the past 24 hours.      Assessment/Plan:      * Acute hepatitis  Appears to be acute hep B infection  Supportive care  Appreciate GI      Acute cystitis without hematuria  Postive UA  Urine Culture pending  Ceftriaxone 1G IVPB ordered      History of pulmonary embolism  Unclear if provoked or not at this time. Not on oac at home      Liver failure  Improving, confusion yesterday morning completely resolved by the evening.  She is doing well now. Not meeting criteria for liver failure  Ruled out        Type 2 diabetes mellitus, with long-term current use of insulin  Patient's FSGs are uncontrolled due to hyperglycemia on current medication regimen.  Last A1c reviewed-   Lab Results   Component Value Date    HGBA1C 11.0 (H) 06/09/2023     Most recent fingerstick glucose reviewed- No results for input(s): POCTGLUCOSE in the last 24 hours.  Current correctional scale  Medium  Maintain anti-hyperglycemic dose as follows-   Antihyperglycemics (From admission,  onward)    Start     Stop Route Frequency Ordered    07/06/23 2100  insulin detemir U-100 injection 10 Units         -- SubQ Nightly 07/05/23 2156 07/05/23 2246  insulin aspart U-100 injection 1-10 Units         -- SubQ Before meals & nightly PRN 07/05/23 2156        Hold Oral hypoglycemics while patient is in the hospital.      VTE Risk Mitigation (From admission, onward)         Ordered     IP VTE LOW RISK PATIENT  Once         07/05/23 2156     Place sequential compression device  Until discontinued         07/05/23 2156                Discharge Planning   OLENA:      Code Status: DNR   Is the patient medically ready for discharge?:     Reason for patient still in hospital (select all that apply): Treatment                     Hunter Villaseñor DO  Department of Hospital Medicine   [facility] - [facility] Tele[facility]

## 2023-07-07 NOTE — PLAN OF CARE
[facility]  Initial Discharge Assessment       Primary Care Provider: Primary Doctor No    Admission Diagnosis: Liver failure [K72.90]    Admission Date: 7/5/2023  Expected Discharge Date:     Transition of Care Barriers: Unisured    Payor: /     Extended Emergency Contact Information  Primary Emergency Contact: Maximo Dupree  Mobile Phone: [phone]  Relation: Spouse  Preferred language: English    Discharge Plan A: Home, Home with family  Discharge Plan B: Home, Home with family      [facility] - [place] - [address] [facility]  [address] [facility] [place] [address]  Phone: [phone] Fax: [phone]    [facility] [address] [place] [address] [address] [address]  Phone: [phone] Fax: [phone]      Initial Assessment (most recent)       Adult Discharge Assessment - 07/07/23 1317          Discharge Assessment    Source of Information patient;family     People in Home spouse     Do you expect to return to your current living situation? Yes     Do you have help at home or someone to help you manage your care at home? Yes     Who are your caregiver(s) and their phone number(s)? Maximo Dupree- Spouse     Prior to hospitilization cognitive status: Unable to Assess     Current cognitive status: Alert/Oriented     Home Accessibility stairs to enter home     Number of Stairs, Main Entrance five     Equipment Currently Used at Home none     Readmission within 30 days? No     Patient currently being followed by outpatient case management? No     Do you currently have service(s) that help you manage your care at home? No     Do you take prescription medications? Yes     Do you have prescription coverage? No     Do you have any problems affording any of your prescribed medications? No     Is the patient taking medications as prescribed? yes     Who is going to help you get home at discharge? Spouse     How do you  get to doctors appointments? family or friend will provide     Are you on dialysis? No     Do you take coumadin? No     Discharge Plan A Home;Home with family     Discharge Plan B Home;Home with family     DME Needed Upon Discharge  none     Discharge Plan discussed with: Patient;Spouse/sig other     Name(s) and Number(s) Maximo Dupree- Spouse [phone]     Transition of Care Barriers Unisured        Physical Activity    On average, how many days per week do you engage in moderate to strenuous exercise (like a brisk walk)? 0 days     On average, how many minutes do you engage in exercise at this level? 0 min        Financial Resource Strain    How hard is it for you to pay for the very basics like food, housing, medical care, and heating? Somewhat hard        Housing Stability    In the last 12 months, was there a time when you were not able to pay the mortgage or rent on time? No     In the last 12 months, was there a time when you did not have a steady place to sleep or slept in a shelter (including now)? No        Transportation Needs    In the past 12 months, has lack of transportation kept you from medical appointments or from getting medications? No     In the past 12 months, has lack of transportation kept you from meetings, work, or from getting things needed for daily living? No        Food Insecurity    Within the past 12 months, you worried that your food would run out before you got the money to buy more. Never true     Within the past 12 months, the food you bought just didn't last and you didn't have money to get more. Never true        Stress    Do you feel stress - tense, restless, nervous, or anxious, or unable to sleep at night because your mind is troubled all the time - these days? Not at all        Social Connections    In a typical week, how many times do you talk on the phone with family, friends, or neighbors? More than three times a week     How often do you get together with friends  or relatives? More than three times a week     How often do you attend Pentecostal or Pentecostalism services? Never     Do you belong to any clubs or organizations such as Pentecostal groups, unions, fraternal or athletic groups, or school groups? No     How often do you attend meetings of the clubs or organizations you belong to? Never     Are you , , , , never , or living with a partner?         Alcohol Use    Q1: How often do you have a drink containing alcohol? Never     Q2: How many drinks containing alcohol do you have on a typical day when you are drinking? Patient does not drink     Q3: How often do you have six or more drinks on one occasion? Never                   SW spoke with pt, spouse, Maximo at bedside. Pt lives at home with spouse, not current with hh and uses no dme. Pt has no payor source and no PCP. Pt stated she was too sick to speak with anyone in financial services re: insurance. Per pt, she already has paperwork for financial assistance. Pt plans to dc back home when medically ready for dc. SDOH questions completed. SW will cont to follow for dc needs.

## 2023-07-07 NOTE — SUBJECTIVE & OBJECTIVE
Interval History: naeo    Review of Systems   Constitutional:  Positive for fatigue.   HENT:  Negative for congestion, hearing loss, mouth sores and nosebleeds.    Eyes:  Negative for discharge and visual disturbance.   Respiratory:  Negative for cough and shortness of breath.    Cardiovascular:  Negative for chest pain and leg swelling.   Gastrointestinal:  Positive for abdominal pain, diarrhea and vomiting.   Endocrine: Negative for cold intolerance, heat intolerance and polydipsia.   Genitourinary:  Negative for difficulty urinating and dysuria.   Musculoskeletal:  Negative for arthralgias.   Allergic/Immunologic: Negative.    Neurological:  Positive for weakness and headaches.   Hematological: Negative.    Psychiatric/Behavioral: Negative.     Objective:     Vital Signs (Most Recent):  Temp: 98.3 °F (36.8 °C) (07/07/23 0705)  Pulse: 82 (07/07/23 0705)  Resp: 18 (07/07/23 0705)  BP: 126/81 (07/07/23 0705)  SpO2: 97 % (07/07/23 0705) Vital Signs (24h Range):  Temp:  [97.3 °F (36.3 °C)-98.3 °F (36.8 °C)] 98.3 °F (36.8 °C)  Pulse:  [69-82] 82  Resp:  [16-18] 18  SpO2:  [96 %-98 %] 97 %  BP: (105-126)/(64-83) 126/81     Weight: 77.7 kg (171 lb 4.8 oz)  Body mass index is 24.58 kg/m².  No intake or output data in the 24 hours ending 07/07/23 0817      Physical Exam  Vitals reviewed.   Constitutional:       Appearance: Normal appearance. She is ill-appearing.   HENT:      Head: Normocephalic and atraumatic.   Eyes:      General: Scleral icterus present.      Pupils: Pupils are equal, round, and reactive to light.   Cardiovascular:      Rate and Rhythm: Regular rhythm. Tachycardia present.      Pulses: Normal pulses.   Pulmonary:      Effort: Pulmonary effort is normal.      Breath sounds: Normal breath sounds.   Abdominal:      General: Abdomen is flat.      Palpations: Abdomen is soft.   Musculoskeletal:         General: Normal range of motion.   Skin:     General: Skin is warm and dry.      Capillary Refill:  Capillary refill takes less than 2 seconds.      Coloration: Skin is jaundiced.   Neurological:      General: No focal deficit present.      Mental Status: She is alert and oriented to person, place, and time.   Psychiatric:         Mood and Affect: Mood normal.         Behavior: Behavior normal.           Significant Labs: All pertinent labs within the past 24 hours have been reviewed.    Significant Imaging: I have reviewed all pertinent imaging results/findings within the past 24 hours.

## 2023-07-08 LAB
MAYO GENERIC ORDERABLE RESULT: NORMAL
SMOOTH MUSCLE AB SER QL IF: NEGATIVE

## 2023-07-10 LAB — MAYO GENERIC ORDERABLE RESULT: ABNORMAL

## 2023-07-12 LAB — HDV AB SER QL IA: NEGATIVE
